# Patient Record
Sex: FEMALE | Race: BLACK OR AFRICAN AMERICAN | NOT HISPANIC OR LATINO | Employment: FULL TIME | URBAN - METROPOLITAN AREA
[De-identification: names, ages, dates, MRNs, and addresses within clinical notes are randomized per-mention and may not be internally consistent; named-entity substitution may affect disease eponyms.]

---

## 2023-05-31 DIAGNOSIS — E55.9 VITAMIN D DEFICIENCY: Primary | ICD-10-CM

## 2023-06-01 RX ORDER — ERGOCALCIFEROL 1.25 MG/1
CAPSULE ORAL
Qty: 12 CAPSULE | Refills: 0 | Status: SHIPPED | OUTPATIENT
Start: 2023-06-01 | End: 2023-10-24 | Stop reason: WASHOUT

## 2023-08-14 ENCOUNTER — OFFICE VISIT (OUTPATIENT)
Dept: PRIMARY CARE | Facility: CLINIC | Age: 48
End: 2023-08-14
Payer: COMMERCIAL

## 2023-08-14 VITALS
TEMPERATURE: 98.1 F | WEIGHT: 193 LBS | RESPIRATION RATE: 16 BRPM | BODY MASS INDEX: 31.15 KG/M2 | SYSTOLIC BLOOD PRESSURE: 129 MMHG | HEART RATE: 68 BPM | DIASTOLIC BLOOD PRESSURE: 87 MMHG

## 2023-08-14 DIAGNOSIS — C64.2 MALIGNANT NEOPLASM OF LEFT KIDNEY, EXCEPT RENAL PELVIS (MULTI): ICD-10-CM

## 2023-08-14 DIAGNOSIS — E78.5 HYPERLIPIDEMIA, UNSPECIFIED HYPERLIPIDEMIA TYPE: ICD-10-CM

## 2023-08-14 DIAGNOSIS — R73.03 PREDIABETES: ICD-10-CM

## 2023-08-14 DIAGNOSIS — I10 PRIMARY HYPERTENSION: Primary | ICD-10-CM

## 2023-08-14 LAB
ALANINE AMINOTRANSFERASE (SGPT) (U/L) IN SER/PLAS: 16 U/L (ref 7–45)
ALBUMIN (G/DL) IN SER/PLAS: 4.5 G/DL (ref 3.4–5)
ALKALINE PHOSPHATASE (U/L) IN SER/PLAS: 92 U/L (ref 33–110)
ANION GAP IN SER/PLAS: 15 MMOL/L (ref 10–20)
ASPARTATE AMINOTRANSFERASE (SGOT) (U/L) IN SER/PLAS: 16 U/L (ref 9–39)
BILIRUBIN TOTAL (MG/DL) IN SER/PLAS: 0.3 MG/DL (ref 0–1.2)
CALCIUM (MG/DL) IN SER/PLAS: 9.9 MG/DL (ref 8.6–10.6)
CARBON DIOXIDE, TOTAL (MMOL/L) IN SER/PLAS: 27 MMOL/L (ref 21–32)
CHLORIDE (MMOL/L) IN SER/PLAS: 99 MMOL/L (ref 98–107)
CHOLESTEROL (MG/DL) IN SER/PLAS: 249 MG/DL (ref 0–199)
CHOLESTEROL IN HDL (MG/DL) IN SER/PLAS: 57.9 MG/DL
CHOLESTEROL/HDL RATIO: 4.3
CREATININE (MG/DL) IN SER/PLAS: 0.88 MG/DL (ref 0.5–1.05)
ERYTHROCYTE DISTRIBUTION WIDTH (RATIO) BY AUTOMATED COUNT: 13.8 % (ref 11.5–14.5)
ERYTHROCYTE MEAN CORPUSCULAR HEMOGLOBIN CONCENTRATION (G/DL) BY AUTOMATED: 31.3 G/DL (ref 32–36)
ERYTHROCYTE MEAN CORPUSCULAR VOLUME (FL) BY AUTOMATED COUNT: 87 FL (ref 80–100)
ERYTHROCYTES (10*6/UL) IN BLOOD BY AUTOMATED COUNT: 4.59 X10E12/L (ref 4–5.2)
ESTIMATED AVERAGE GLUCOSE FOR HBA1C: 131 MG/DL
GFR FEMALE: 82 ML/MIN/1.73M2
GLUCOSE (MG/DL) IN SER/PLAS: 97 MG/DL (ref 74–99)
HEMATOCRIT (%) IN BLOOD BY AUTOMATED COUNT: 39.9 % (ref 36–46)
HEMOGLOBIN (G/DL) IN BLOOD: 12.5 G/DL (ref 12–16)
HEMOGLOBIN A1C/HEMOGLOBIN TOTAL IN BLOOD: 6.2 %
LDL: 159 MG/DL (ref 0–99)
LEUKOCYTES (10*3/UL) IN BLOOD BY AUTOMATED COUNT: 6.5 X10E9/L (ref 4.4–11.3)
NRBC (PER 100 WBCS) BY AUTOMATED COUNT: 0 /100 WBC (ref 0–0)
PLATELETS (10*3/UL) IN BLOOD AUTOMATED COUNT: 494 X10E9/L (ref 150–450)
POTASSIUM (MMOL/L) IN SER/PLAS: 4.7 MMOL/L (ref 3.5–5.3)
PROTEIN TOTAL: 8.1 G/DL (ref 6.4–8.2)
SODIUM (MMOL/L) IN SER/PLAS: 136 MMOL/L (ref 136–145)
THYROTROPIN (MIU/L) IN SER/PLAS BY DETECTION LIMIT <= 0.05 MIU/L: 0.97 MIU/L (ref 0.44–3.98)
TRIGLYCERIDE (MG/DL) IN SER/PLAS: 160 MG/DL (ref 0–149)
UREA NITROGEN (MG/DL) IN SER/PLAS: 13 MG/DL (ref 6–23)
VLDL: 32 MG/DL (ref 0–40)

## 2023-08-14 PROCEDURE — 3079F DIAST BP 80-89 MM HG: CPT | Performed by: STUDENT IN AN ORGANIZED HEALTH CARE EDUCATION/TRAINING PROGRAM

## 2023-08-14 PROCEDURE — 3074F SYST BP LT 130 MM HG: CPT | Performed by: STUDENT IN AN ORGANIZED HEALTH CARE EDUCATION/TRAINING PROGRAM

## 2023-08-14 PROCEDURE — 99204 OFFICE O/P NEW MOD 45 MIN: CPT | Performed by: STUDENT IN AN ORGANIZED HEALTH CARE EDUCATION/TRAINING PROGRAM

## 2023-08-14 PROCEDURE — 84443 ASSAY THYROID STIM HORMONE: CPT

## 2023-08-14 PROCEDURE — 85027 COMPLETE CBC AUTOMATED: CPT

## 2023-08-14 PROCEDURE — 80053 COMPREHEN METABOLIC PANEL: CPT

## 2023-08-14 PROCEDURE — 83036 HEMOGLOBIN GLYCOSYLATED A1C: CPT

## 2023-08-14 PROCEDURE — 90677 PCV20 VACCINE IM: CPT | Performed by: STUDENT IN AN ORGANIZED HEALTH CARE EDUCATION/TRAINING PROGRAM

## 2023-08-14 PROCEDURE — 80061 LIPID PANEL: CPT

## 2023-08-14 PROCEDURE — 90471 IMMUNIZATION ADMIN: CPT | Performed by: STUDENT IN AN ORGANIZED HEALTH CARE EDUCATION/TRAINING PROGRAM

## 2023-08-14 RX ORDER — HYDROCHLOROTHIAZIDE 25 MG/1
25 TABLET ORAL DAILY
COMMUNITY
Start: 2019-07-08 | End: 2023-08-14 | Stop reason: SDUPTHER

## 2023-08-14 RX ORDER — HYDROCHLOROTHIAZIDE 25 MG/1
25 TABLET ORAL DAILY
Qty: 30 TABLET | Refills: 2 | Status: SHIPPED | OUTPATIENT
Start: 2023-08-14 | End: 2023-11-16

## 2023-08-14 RX ORDER — AMLODIPINE BESYLATE 10 MG/1
10 TABLET ORAL DAILY
COMMUNITY
Start: 2021-01-07 | End: 2023-08-14 | Stop reason: SDUPTHER

## 2023-08-14 RX ORDER — AMLODIPINE BESYLATE 10 MG/1
10 TABLET ORAL DAILY
Qty: 30 TABLET | Refills: 2 | Status: SHIPPED | OUTPATIENT
Start: 2023-08-14 | End: 2023-11-16

## 2023-08-14 ASSESSMENT — ENCOUNTER SYMPTOMS
SPEECH DIFFICULTY: 0
NUMBNESS: 0
SHORTNESS OF BREATH: 0
DIZZINESS: 0
ACTIVITY CHANGE: 0
WEAKNESS: 0
ABDOMINAL PAIN: 0
DEPRESSION: 0
VOMITING: 0
DYSURIA: 0
NAUSEA: 0
WHEEZING: 0
HYPERTENSION: 1
HEMATURIA: 0
CONSTIPATION: 0
FEVER: 0
COUGH: 0

## 2023-08-14 NOTE — PROGRESS NOTES
Subjective   Patient ID: Anita Ac is a 48 y.o. female who presents for Establish Care and Hypertension.  Hypertension  Pertinent negatives include no chest pain or shortness of breath.         Ms. Ac is 48 years old here to establish care    Medical history significant for   #1 hypertension-amlodipine 10 mg, hydrochlorothiazide 25 mg  2.  Renal cell carcinoma-s/p left partial nephrectomy in 2020 with clear-cell renal carcinoma-following up with oncology-current monitoring clinically  3.  DVT on Xarelto  #4 prediabetes  5.  Syphilis positive in the past- -had penicillin shots ; negative VDRL recently  6.  Hyperlipidemia          Cancer screening  1.  Mammogram-August 2022 negative  Renal cell carcinoma-following up with nephrology  Colonoscopy December 20 22-2 polyps distal transverse colon [tubular adenoma, benign colonic mucosa] and diverticulitis  Pap October 2018-negative with negative HPV      Surgical history includes left partial nephrectomy, hysterectomy , left rotator cuff tear   Smokes marijuana   No known allergies  Father: DM   Htn : mother   Cad: mother 50s       Past Medical History:   Diagnosis Date    Personal history of other benign neoplasm     History of uterine leiomyoma    Personal history of other benign neoplasm 02/06/2020    History of other benign neoplasm    Personal history of other diseases of the female genital tract 02/06/2020    History of abnormal uterine bleeding    Unspecified abdominal pain 11/11/2020    Abdominal pain, recurrent      Past Surgical History:   Procedure Laterality Date    CT GUIDED PERCUTANEOUS BIOPSY MEDIASTINUM  3/26/2015    CT GUIDED PERCUTANEOUS BIOPSY MEDIASTINUM 3/26/2015 [facility]ICAL [facility]GAC[facility]    OTHER SURGICAL HISTORY  08/11/2014    Uterine Myomectomy    OTHER SURGICAL HISTORY  08/11/2014    Wrist Surgery    OTHER SURGICAL HISTORY  08/02/2022    Rotator cuff repair    OTHER SURGICAL HISTORY  03/11/2020    Hysterectomy abdominal      No family  history on file.   Not on File       Occupation:     Review of Systems   Constitutional:  Negative for activity change and fever.   HENT:  Negative for congestion.    Respiratory:  Negative for cough, shortness of breath and wheezing.    Cardiovascular:  Negative for chest pain and leg swelling.   Gastrointestinal:  Negative for abdominal pain, constipation, nausea and vomiting.   Endocrine: Negative for cold intolerance.   Genitourinary:  Negative for dysuria, hematuria and urgency.   Neurological:  Negative for dizziness, speech difficulty, weakness and numbness.   Psychiatric/Behavioral:  Negative for self-injury and suicidal ideas.        Objective   Visit Vitals  /87   Pulse 68   Temp 36.7 °C (98.1 °F)   Resp 16   Wt 87.5 kg (193 lb)   BMI 31.15 kg/m²   BSA 2.02 m²      Physical Exam  Constitutional:       Appearance: Normal appearance.   HENT:      Head: Normocephalic and atraumatic.      Nose: Nose normal.      Mouth/Throat:      Mouth: Mucous membranes are moist.   Eyes:      Conjunctiva/sclera: Conjunctivae normal.      Pupils: Pupils are equal, round, and reactive to light.   Cardiovascular:      Rate and Rhythm: Normal rate and regular rhythm.      Pulses: Normal pulses.      Heart sounds: Normal heart sounds.   Pulmonary:      Effort: Pulmonary effort is normal.      Breath sounds: Normal breath sounds.   Musculoskeletal:         General: Normal range of motion.      Cervical back: Neck supple.   Skin:     General: Skin is warm.   Neurological:      General: No focal deficit present.      Mental Status: She is alert and oriented to person, place, and time.   Psychiatric:         Mood and Affect: Mood normal.         Behavior: Behavior normal.         Thought Content: Thought content normal.         Judgment: Judgment normal.         Assessment/Plan     Problem List Items Addressed This Visit       Malignant neoplasm of left kidney, except renal pelvis (CMS/HCC)    Relevant Orders    Comprehensive  Metabolic Panel    Pneumococcal conjugate vaccine, 20-valent, adult (PREVNAR 20) (Completed)     Other Visit Diagnoses       Primary hypertension    -  Primary    Relevant Medications    amLODIPine (Norvasc) 10 mg tablet    hydroCHLOROthiazide (HYDRODiuril) 25 mg tablet    Other Relevant Orders    CBC    Hyperlipidemia, unspecified hyperlipidemia type        Relevant Orders    Comprehensive Metabolic Panel    Lipid Panel    TSH with reflex to Free T4 if abnormal    Prediabetes        Relevant Orders    Hemoglobin A1C    Comprehensive Metabolic Panel        Overall patient is here to establish care.  Medical history as above.  Agreeable to get repeat/updated labs.  1.  Hypertension  Currently at goal  To continue amlodipine 10 mg and hydrochlorothiazide 25  Medication refill  2.  Hyperlipidemia  We will repeat cholesterol panel in a fasting state.  Discuss further evaluation and management based on levels/ASCVD score  3.  Prediabetes  Repeat A1c    Agreeable to take pneumonia [undergoing current surveillance for malignancy].  Benefits risks discussed.  Agreeable  Once about results are obtained we will call her with abnormal results of any and discuss further evaluation and management otherwise patient to see me in 3 months for a follow-up or sooner if needed.

## 2023-10-18 PROBLEM — E66.811 CLASS 1 OBESITY WITH BODY MASS INDEX (BMI) OF 32.0 TO 32.9 IN ADULT: Status: ACTIVE | Noted: 2023-10-18

## 2023-10-18 PROBLEM — I10 ESSENTIAL HYPERTENSION: Status: ACTIVE | Noted: 2017-07-05

## 2023-10-18 PROBLEM — E66.9 CLASS 1 OBESITY WITH BODY MASS INDEX (BMI) OF 32.0 TO 32.9 IN ADULT: Status: ACTIVE | Noted: 2023-10-18

## 2023-10-18 PROBLEM — I82.409 DEEP VEIN THROMBOSIS (DVT) (MULTI): Status: ACTIVE | Noted: 2023-10-18

## 2023-10-18 PROBLEM — K57.90 DIVERTICULOSIS: Status: ACTIVE | Noted: 2023-10-18

## 2023-10-18 PROBLEM — R93.5 ABNORMAL COMPUTED TOMOGRAPHY OF ABDOMEN AND PELVIS: Status: ACTIVE | Noted: 2023-10-18

## 2023-10-18 PROBLEM — Z86.718 HISTORY OF VENOUS THROMBOEMBOLISM: Status: ACTIVE | Noted: 2023-10-18

## 2023-10-18 PROBLEM — K57.32 DIVERTICULITIS OF SIGMOID COLON: Status: ACTIVE | Noted: 2023-10-18

## 2023-10-18 PROBLEM — K52.9 GASTROENTERITIS, ACUTE: Status: ACTIVE | Noted: 2023-10-18

## 2023-10-18 PROBLEM — C64.9 RENAL CANCER (MULTI): Status: ACTIVE | Noted: 2023-10-18

## 2023-10-18 PROBLEM — E55.9 VITAMIN D DEFICIENCY: Status: ACTIVE | Noted: 2023-10-18

## 2023-10-18 PROBLEM — I10 CHRONIC HYPERTENSION: Status: ACTIVE | Noted: 2023-10-18

## 2023-10-18 PROBLEM — E78.00 HYPERCHOLESTEREMIA: Status: ACTIVE | Noted: 2023-10-18

## 2023-10-18 PROBLEM — R39.9 ABNORMAL FINDING OF KIDNEY: Status: ACTIVE | Noted: 2023-10-18

## 2023-10-18 PROBLEM — N64.3 GALACTORRHEA: Status: ACTIVE | Noted: 2023-10-18

## 2023-10-18 PROBLEM — R73.9 HYPERGLYCEMIA: Status: ACTIVE | Noted: 2023-10-18

## 2023-10-18 PROBLEM — N28.89 RENAL MASS: Status: ACTIVE | Noted: 2023-10-18

## 2023-10-18 PROBLEM — S69.92XA INJURY OF LEFT RING FINGER: Status: ACTIVE | Noted: 2023-10-18

## 2023-10-18 RX ORDER — RIVAROXABAN 10 MG/1
1 TABLET, FILM COATED ORAL DAILY
COMMUNITY
Start: 2023-06-07 | End: 2023-10-24 | Stop reason: WASHOUT

## 2023-10-18 RX ORDER — LIDOCAINE 50 MG/G
PATCH TOPICAL
COMMUNITY
Start: 2023-03-01

## 2023-10-18 RX ORDER — IBUPROFEN 800 MG/1
TABLET ORAL
COMMUNITY
Start: 2022-12-15 | End: 2023-10-24 | Stop reason: WASHOUT

## 2023-10-18 RX ORDER — DICYCLOMINE HYDROCHLORIDE 10 MG/1
CAPSULE ORAL
COMMUNITY
Start: 2023-06-06 | End: 2023-10-24 | Stop reason: WASHOUT

## 2023-10-18 RX ORDER — CIPROFLOXACIN 250 MG/1
1 TABLET, FILM COATED ORAL EVERY 12 HOURS
COMMUNITY
End: 2023-10-24 | Stop reason: WASHOUT

## 2023-10-18 RX ORDER — APIXABAN 5 MG/1
5 TABLET, FILM COATED ORAL EVERY 12 HOURS
COMMUNITY
End: 2023-10-24 | Stop reason: WASHOUT

## 2023-10-18 RX ORDER — TRIAMCINOLONE ACETONIDE 1 MG/ML
LOTION TOPICAL
COMMUNITY
Start: 2023-03-24 | End: 2024-01-12 | Stop reason: SDUPTHER

## 2023-10-18 RX ORDER — ACETAMINOPHEN 500 MG
1000 TABLET ORAL DAILY
COMMUNITY
Start: 2021-11-18

## 2023-10-19 ENCOUNTER — OFFICE VISIT (OUTPATIENT)
Dept: UROLOGY | Facility: CLINIC | Age: 48
End: 2023-10-19
Payer: COMMERCIAL

## 2023-10-19 VITALS — BODY MASS INDEX: 32.82 KG/M2 | HEIGHT: 65 IN | WEIGHT: 197 LBS

## 2023-10-19 DIAGNOSIS — C64.9 MALIGNANT NEOPLASM OF KIDNEY, UNSPECIFIED LATERALITY (MULTI): Primary | ICD-10-CM

## 2023-10-19 PROCEDURE — 99213 OFFICE O/P EST LOW 20 MIN: CPT | Performed by: STUDENT IN AN ORGANIZED HEALTH CARE EDUCATION/TRAINING PROGRAM

## 2023-10-19 PROCEDURE — 1036F TOBACCO NON-USER: CPT | Performed by: STUDENT IN AN ORGANIZED HEALTH CARE EDUCATION/TRAINING PROGRAM

## 2023-10-19 NOTE — PROGRESS NOTES
HPI:  Procedure: L partial nephrectomy 9/30/2020  Pathology: 1.8 cm G2 Clear cell RCC -SMS      48 year old otherwise healthy woman s/p L PNx. Cre 0.98 (11/17/22). Staging CT concern for incidental DVT. Went to ER where duplex was cw DVT and started on Eliquis. S/p L partial nephrectomy (9/30/20) with pathology showing 1.8 cm G2 Clear cell RCC -SMS. CT AP w IV Contrast (11/18/21) with postoperative change of the LEFT kidney, hepatic steatosis, no suspicious hepatic lesions, no biliary dilatation, gallbladder appears within normal limits, prominent bilateral ovarian follicles, relative thickening of the wall of the urinary bladder suggestive of cystitis. CT CAP (11/23/22) showed no evidence of recurrence or new suspicious lesions involving the osseous or soft tissues structures of the chest abdomen or pelvis, post-op changes consistent with left partial nephrectomy with no abnormal soft tissue lesions in the post-op bed. XR chest (6/1/23) was SANDRO. CT AP (6/2/23) showed postsurgical changes of left partial nephrectomy without evidence of locoregional or metastatic disease in the abdomen or pelvis. Reports constipation. Good urinary function.      Cre: 0.98 (11/17/22), 0.86 (1/7/22), 0.9 (1/4/21), baseline 0.94 (8/17/20)     CT AP w IV Contrast (1/4/21): Postsurgical changes status post partial left nephrectomy with resection of a previously visualized mass in the upper pole the left kidney. Mild nonspecific perinephric stranding is visualized adjacent to the left kidney without definite evidence of local tumor recurrence. Enlarged appearance of the ovaries with multiple cysts versus follicles and dilated tubular structures in the region of the bilateral adnexa. Further evaluation with pelvic ultrasound is recommended to exclude a component of polycystic ovarian disease and/or hydrosalpinx. Dilated appearance of the right external iliac and proximal right common femoral vein with suggestion of possible filling defect.  Although this finding may be related to mixing artifact, thrombosis involving these veins cannot fully be excluded. Further evaluation with a lower extremity duplex ultrasound study could be considered. Colonic diverticulosis without evidence of acute diverticulitis. Stable small umbilical hernia with associated mild fat stranding and protrusion of several nondilated loops of small bowel.     CT AP w IV Contrast (11/18/21): Postoperative change of the LEFT kidney noted. Hepatic steatosis is present. No suspicious hepatic lesion is demonstrated. No biliary dilatation is demonstrated. The gallbladder appears within normal limits. Prominent bilateral ovarian follicles. Relative thickening of the wall of the urinary bladder is present, suggestive of cystitis.     CT CAP (11/23/22) w/ contrast: no CT evidence of recurrence or new suspicious lesions involving the osseous or soft tissues structures of the chest abdomen or pelvis, post-op changes consistent with left partial nephrectomy with no abnormal soft tissue lesions in the post-op bed, redemonstration of stable filling defect in the right deep femoral vein which is unchanged compared to prior examination on 11/18/2021 and 01/04/2021.     XR chest (6/1/23): SANDRO    CT AP (6/2/23): postsurgical changes of left partial nephrectomy without evidence of locoregional or metastatic disease in the abdomen or pelvis.    Review of Systems:  All systems reviewed. Anything negative noted in the HPI.    Physical Exam:  Vitals signs reviewed.  Constitutional:      Appearance: Well-developed.  HENT:     Head: Normocephalic and atraumatic.  Neck:     Musculoskeletal: Normal range of motion.  Pulmonary:     Effort: Pulmonary effort is normal.  Musculoskeletal: Normal range of motion.  Skin:     General: Skin is warm and dry.  Neurological:     Mental Status: Alert and oriented to person, place, and time.  Psychiatric:        Behavior: Behavior normal.        Thought Content: Thought  content normal.        Judgment: Judgment normal.  Abdominal:     Palpations: Abdomen is soft. There is no mass.     Tenderness: There is no tenderness. There is no guarding or rebound.     Hernia: No hernia is present.     Comments:     Assessment/Plan   48 year old otherwise healthy woman s/p L PNx. Cre 0.98 (11/17/22). Staging CT concern for incidental DVT. Went to ER where duplex was cw DVT and started on Eliquis. S/p L partial nephrectomy (9/30/20) with pathology showing 1.8 cm G2 Clear cell RCC -SMS. CT AP w IV Contrast (11/18/21) with postoperative change of the LEFT kidney, hepatic steatosis, no suspicious hepatic lesions, no biliary dilatation, gallbladder appears within normal limits, prominent bilateral ovarian follicles, relative thickening of the wall of the urinary bladder suggestive of cystitis. CT CAP (11/23/22) showed no evidence of recurrence or new suspicious lesions involving the osseous or soft tissues structures of the chest abdomen or pelvis, post-op changes consistent with left partial nephrectomy with no abnormal soft tissue lesions in the post-op bed. XR chest (6/1/23) was SANDRO. CT AP (6/2/23) showed postsurgical changes of left partial nephrectomy without evidence of locoregional or metastatic disease in the abdomen or pelvis. Good urinary function. Management options including risks, benefits and alternatives discussed at length and all questions answered. Patient prefers to proceed with follow-up in June with CT CAP.             By signing my name below, I, Tony Corley, attest that this documentation has been prepared under the direction and in the presence of Dr. Jose Alfredo.  All medical record entries made by the Scribe were at my direction and personally dictated by me. I have reviewed the chart and agree that the record accurately reflects my personal performance of the history, physical exam, discussion and plan.

## 2023-10-24 ENCOUNTER — OFFICE VISIT (OUTPATIENT)
Dept: GASTROENTEROLOGY | Facility: HOSPITAL | Age: 48
End: 2023-10-24
Payer: COMMERCIAL

## 2023-10-24 VITALS
BODY MASS INDEX: 34.96 KG/M2 | HEART RATE: 80 BPM | TEMPERATURE: 97.4 F | OXYGEN SATURATION: 98 % | DIASTOLIC BLOOD PRESSURE: 95 MMHG | HEIGHT: 63 IN | WEIGHT: 197.3 LBS | SYSTOLIC BLOOD PRESSURE: 151 MMHG

## 2023-10-24 DIAGNOSIS — K58.1 IRRITABLE BOWEL SYNDROME WITH CONSTIPATION: Primary | ICD-10-CM

## 2023-10-24 PROCEDURE — 99214 OFFICE O/P EST MOD 30 MIN: CPT | Performed by: NURSE PRACTITIONER

## 2023-10-24 PROCEDURE — 1036F TOBACCO NON-USER: CPT | Performed by: NURSE PRACTITIONER

## 2023-10-24 PROCEDURE — 3077F SYST BP >= 140 MM HG: CPT | Performed by: NURSE PRACTITIONER

## 2023-10-24 PROCEDURE — 3080F DIAST BP >= 90 MM HG: CPT | Performed by: NURSE PRACTITIONER

## 2023-10-24 RX ORDER — KETOCONAZOLE 20 MG/ML
SHAMPOO, SUSPENSION TOPICAL
COMMUNITY
Start: 2023-09-03 | End: 2024-01-12 | Stop reason: SDUPTHER

## 2023-10-24 RX ORDER — POLYETHYLENE GLYCOL 3350 17 G/17G
17 POWDER, FOR SOLUTION ORAL DAILY
Qty: 30 PACKET | Refills: 11 | Status: SHIPPED | OUTPATIENT
Start: 2023-10-24 | End: 2024-10-23

## 2023-10-24 RX ORDER — KETOCONAZOLE 20 MG/G
CREAM TOPICAL
COMMUNITY
Start: 2023-09-03 | End: 2024-01-12 | Stop reason: SDUPTHER

## 2023-10-24 SDOH — ECONOMIC STABILITY: FOOD INSECURITY: WITHIN THE PAST 12 MONTHS, THE FOOD YOU BOUGHT JUST DIDN'T LAST AND YOU DIDN'T HAVE MONEY TO GET MORE.: NEVER TRUE

## 2023-10-24 SDOH — ECONOMIC STABILITY: FOOD INSECURITY: WITHIN THE PAST 12 MONTHS, YOU WORRIED THAT YOUR FOOD WOULD RUN OUT BEFORE YOU GOT MONEY TO BUY MORE.: NEVER TRUE

## 2023-10-24 ASSESSMENT — PATIENT HEALTH QUESTIONNAIRE - PHQ9
2. FEELING DOWN, DEPRESSED OR HOPELESS: NOT AT ALL
1. LITTLE INTEREST OR PLEASURE IN DOING THINGS: NOT AT ALL
SUM OF ALL RESPONSES TO PHQ9 QUESTIONS 1 & 2: 0
1. LITTLE INTEREST OR PLEASURE IN DOING THINGS: NOT AT ALL
2. FEELING DOWN, DEPRESSED OR HOPELESS: NOT AT ALL
SUM OF ALL RESPONSES TO PHQ9 QUESTIONS 1 AND 2: 0

## 2023-10-24 ASSESSMENT — LIFESTYLE VARIABLES
HOW OFTEN DO YOU HAVE A DRINK CONTAINING ALCOHOL: 2-3 TIMES A WEEK
HOW MANY STANDARD DRINKS CONTAINING ALCOHOL DO YOU HAVE ON A TYPICAL DAY: 3 OR 4
AUDIT-C TOTAL SCORE: 4
SKIP TO QUESTIONS 9-10: 0
HOW OFTEN DO YOU HAVE SIX OR MORE DRINKS ON ONE OCCASION: NEVER

## 2023-10-24 ASSESSMENT — ENCOUNTER SYMPTOMS
ABDOMINAL PAIN: 1
OCCASIONAL FEELINGS OF UNSTEADINESS: 0
LOSS OF SENSATION IN FEET: 0
DEPRESSION: 0

## 2023-10-24 ASSESSMENT — PAIN SCALES - GENERAL: PAINLEVEL: 0-NO PAIN

## 2023-10-24 NOTE — PATIENT INSTRUCTIONS
Irritable bowel syndrome with constipation- Please continue the metamucil and dicyclomine. I will add miralax one capful in a glass of water or juice daily to help you have a more complete BM.     Please call me in 1 week to let me know how you are doing with the miralax, if this is not enough I would recommend starting on linzess 290 mcg daily.  [phone]

## 2023-10-24 NOTE — PROGRESS NOTES
Subjective   Patient ID: Anita Ac is a 48 y.o. female. For follow up of diverticulitis    Abdominal Pain    Previously seen 6/6/23 for acute sigmoid diverticulitis, at that visit I recommended miralax and dicyclomine and adding a fiber supplement  Labs reviewed  9/17/23  Normal cbc and LFT's, lipase 23    Hx left partial nephrectomy  Last colonoscopy 12/2/22 - 2 polyps, pancolonic diverticulosis  Still having pain  Bm: once a week ,incomplete  Using fiber and using the dicyclomine , didn't use the miralax  Pain improves some with BM  Water- lots  Review of Systems   Gastrointestinal:  Positive for abdominal pain.       Objective   Physical Exam  Cardiovascular:      Rate and Rhythm: Normal rate and regular rhythm.      Pulses: Normal pulses.      Heart sounds: Normal heart sounds.   Pulmonary:      Effort: Pulmonary effort is normal.      Breath sounds: Normal breath sounds.   Abdominal:      General: Bowel sounds are normal.      Palpations: Abdomen is soft.         Assessment/Plan   There are no diagnoses linked to this encounter.  Irritable bowel syndrome with constipation- Please continue the metamucil and dicyclomine. I will add miralax one capful in a glass of water or juice daily to help you have a more complete BM.     Please call me in 1 week to let me know how you are doing with the miralax, if this is not enough I would recommend starting on linzess 290 mcg daily.  [phone]

## 2023-11-15 DIAGNOSIS — I10 PRIMARY HYPERTENSION: ICD-10-CM

## 2023-11-16 RX ORDER — AMLODIPINE BESYLATE 10 MG/1
10 TABLET ORAL DAILY
Qty: 30 TABLET | Refills: 2 | Status: SHIPPED | OUTPATIENT
Start: 2023-11-16

## 2023-11-16 RX ORDER — HYDROCHLOROTHIAZIDE 25 MG/1
25 TABLET ORAL DAILY
Qty: 30 TABLET | Refills: 2 | Status: SHIPPED | OUTPATIENT
Start: 2023-11-16

## 2023-11-17 DIAGNOSIS — I82.4Y9 DEEP VEIN THROMBOSIS (DVT) OF PROXIMAL LOWER EXTREMITY, UNSPECIFIED CHRONICITY, UNSPECIFIED LATERALITY (MULTI): Primary | ICD-10-CM

## 2024-01-12 ENCOUNTER — OFFICE VISIT (OUTPATIENT)
Dept: DERMATOLOGY | Facility: CLINIC | Age: 49
End: 2024-01-12
Payer: COMMERCIAL

## 2024-01-12 DIAGNOSIS — L21.9 SEBORRHEIC DERMATITIS: Primary | ICD-10-CM

## 2024-01-12 PROCEDURE — 99213 OFFICE O/P EST LOW 20 MIN: CPT | Performed by: DERMATOLOGY

## 2024-01-12 PROCEDURE — 1036F TOBACCO NON-USER: CPT | Performed by: DERMATOLOGY

## 2024-01-12 RX ORDER — KETOCONAZOLE 20 MG/G
CREAM TOPICAL
Qty: 60 G | Refills: 11 | Status: SHIPPED | OUTPATIENT
Start: 2024-01-12

## 2024-01-12 RX ORDER — TRIAMCINOLONE ACETONIDE 1 MG/ML
LOTION TOPICAL DAILY
Qty: 60 ML | Refills: 3 | Status: SHIPPED | OUTPATIENT
Start: 2024-01-12

## 2024-01-12 RX ORDER — KETOCONAZOLE 20 MG/ML
SHAMPOO, SUSPENSION TOPICAL
Qty: 120 ML | Refills: 11 | Status: SHIPPED | OUTPATIENT
Start: 2024-01-12

## 2024-01-12 NOTE — PROGRESS NOTES
Subjective     Anita Ac is a 48 y.o. female who presents for the following: Seborrheic Dermatitis (Ketoconazole shampoo and cream, Triamcinolone lotion - works okay ).     Patient last seen 3/24/23 for seborrheic dermatitis, was given ketoconazole 2% shampoo, 2% cream, and TAC 0.1% lotion for her scalp for itching. Patient states that it has improved. Patient states that her scalp is itchy but improves when she washes her hair with ketoconazole shampoo. Patient states that she washes her hair every 2 weeks. States that her scalp also improves when she uses TAC 0.1% lotion, which she uses 2-3 times a week.     Review of Systems:  No other skin or systemic complaints other than what is documented elsewhere in the note.    The following portions of the chart were reviewed this encounter and updated as appropriate:       Skin Cancer History  No skin cancer on file.    Specialty Problems    None    Past Medical History:  Anita Ac  has a past medical history of Personal history of other benign neoplasm, Personal history of other benign neoplasm (02/06/2020), Personal history of other diseases of the female genital tract (02/06/2020), and Unspecified abdominal pain (11/11/2020).    Past Surgical History:  Anita Ac  has a past surgical history that includes Other surgical history (08/11/2014); Other surgical history (08/11/2014); Other surgical history (08/02/2022); Other surgical history (03/11/2020); and CT guided percutaneous biopsy mediastinum (3/26/2015).    Family History:  Patient family history includes Diabetes in her father.    Social History:  Anita Ac  reports that she has never smoked. She has been exposed to tobacco smoke. She has never used smokeless tobacco. She reports current alcohol use. She reports current drug use. Frequency: 7.00 times per week. Drug: Marijuana.    Allergies:  Latex, natural rubber; Shellfish derived; and Other    Current Medications / CAM's:    Current  Outpatient Medications:     amLODIPine (Norvasc) 10 mg tablet, TAKE 1 TABLET(10 MG) BY MOUTH EVERY DAY, Disp: 30 tablet, Rfl: 2    hydroCHLOROthiazide (HYDRODiuril) 25 mg tablet, TAKE 1 TABLET(25 MG) BY MOUTH EVERY DAY, Disp: 30 tablet, Rfl: 2    rivaroxaban (Xarelto) 10 mg tablet, Take 1 tablet (10 mg) by mouth once daily., Disp: 30 tablet, Rfl: 11    acetaminophen (Tylenol) 500 mg tablet, Take 2 tablets (1,000 mg) by mouth once daily., Disp: , Rfl:     ketoconazole (NIZOral) 2 % cream, APPLY TOPICALLY TO THE AFFECTED AREA OF FACE TWICE DAILY, Disp: 60 g, Rfl: 11    ketoconazole (NIZOral) 2 % shampoo, LATHER INTO SCALP AND LET SIT FOR 5 MINUTES BEFORE RINSING EVERY WEEK, Disp: 120 mL, Rfl: 11    lidocaine (Lidoderm) 5 % patch, Lidocaine 5 % External Patch Quantity: 7  Refills: 0  Start: 1-Mar-2023, Disp: , Rfl:     polyethylene glycol (Glycolax, Miralax) packet, Take 17 g by mouth once daily., Disp: 30 packet, Rfl: 11    triamcinolone (Kenalog) 0.1 % lotion, Apply topically once daily. Apply to itchy areas of scalp daily as needed, Disp: 60 mL, Rfl: 3     Objective   Well appearing patient in no apparent distress; mood and affect are within normal limits.    A skin examination was performed including the face and neck. All findings within normal limits unless otherwise noted below.    Assessment/Plan   1. Seborrheic dermatitis  Head - Anterior (Face), Scalp  On the scalp is diffuse thin flaky scaling.     Seborrheic dermatitis - well-controlled on scalp and face  - Discussed the chronic, waxing and waning nature of this disease and its relation to commensal yeast on the skin   - Continue ketoconazole 2% shampoo to scalp when showering; recommended to increase use from every two weeks to once weekly. Lather on for 5-10 minutes then rinse in shower.    - Continue ketoconazole 2% cream to face BID as needed  - Continue triamcinolone 0.1% lotion to scalp once daily for pruritus as needed  - Follow up in 1  year    Related Medications  ketoconazole (NIZOral) 2 % cream  APPLY TOPICALLY TO THE AFFECTED AREA OF FACE TWICE DAILY    ketoconazole (NIZOral) 2 % shampoo  LATHER INTO SCALP AND LET SIT FOR 5 MINUTES BEFORE RINSING EVERY WEEK    triamcinolone (Kenalog) 0.1 % lotion  Apply topically once daily. Apply to itchy areas of scalp daily as needed      Jacob Valiente, DO    I saw and evaluated the patient. I personally obtained the key and critical portions of the history and physical exam or was physically present for key and critical portions performed by the resident/fellow. I reviewed the resident/fellow's documentation and discussed the patient with the resident/fellow. I agree with the resident/fellow's medical decision making as documented in the note.    Russell Bateman MD PhD

## 2024-04-05 ENCOUNTER — APPOINTMENT (OUTPATIENT)
Dept: CARDIOLOGY | Facility: HOSPITAL | Age: 49
End: 2024-04-05
Payer: COMMERCIAL

## 2024-04-16 ENCOUNTER — OFFICE VISIT (OUTPATIENT)
Dept: CARDIOLOGY | Facility: HOSPITAL | Age: 49
End: 2024-04-16
Payer: COMMERCIAL

## 2024-04-16 VITALS
RESPIRATION RATE: 16 BRPM | SYSTOLIC BLOOD PRESSURE: 135 MMHG | BODY MASS INDEX: 35.15 KG/M2 | WEIGHT: 198.4 LBS | HEIGHT: 63 IN | DIASTOLIC BLOOD PRESSURE: 85 MMHG | HEART RATE: 75 BPM

## 2024-04-16 DIAGNOSIS — I82.4Y9 DEEP VEIN THROMBOSIS (DVT) OF PROXIMAL LOWER EXTREMITY, UNSPECIFIED CHRONICITY, UNSPECIFIED LATERALITY (MULTI): ICD-10-CM

## 2024-04-16 PROCEDURE — 3075F SYST BP GE 130 - 139MM HG: CPT | Performed by: INTERNAL MEDICINE

## 2024-04-16 PROCEDURE — 99214 OFFICE O/P EST MOD 30 MIN: CPT | Performed by: INTERNAL MEDICINE

## 2024-04-16 PROCEDURE — 3079F DIAST BP 80-89 MM HG: CPT | Performed by: INTERNAL MEDICINE

## 2024-04-16 ASSESSMENT — PAIN SCALES - GENERAL: PAINLEVEL: 7

## 2024-04-16 NOTE — PROGRESS NOTES
"OUTPATIENT FOLLOW-UP -  VASCULAR MEDICINE    DOS: 04/16/2024  Last seen:   09/22/2023     REQUESTING PHYSICIAN:  Maite Negrete MD PCP    REASON FOR FOLLOW-UP:  here for follow up h/o DVT    HISTORY OF PRESENT ILLNESS:     49 yo lady here for follow up h/o DVT. Prev AC resumed bc of + d-dimer (963). Reports now out of xarelto x 2 months. States was taking this every day. Last seen in Sept. In Nov Rx was for #30 tabs and 11 refills. States lost the county insurance 2 months ago and there fore did not have drug. The insurance issues have been resolved but did not follow up with the pharmacy. Not using the med-alert bracelet. Called the pharmacy - Rx last filled in Sept. Reports while off the xarelto has been taking ASA 81 mg daily.     Since last seen: reports follow up for the RCCa is June.     Having RLE pain for a few weeks - seen in the ED. Reports xray and US - told to have a popliteal cyst and ?ACL or ligament issues - planned ortho eval next week.    [facility] US:  IMPRESSION:     Negative study for proximal DVT in the right lower extremity.     Negative study for calf DVT in the right lower extremity.     Negative study for superficial thrombophlebitis in the imaged segments of the right lower extremity.     3.9 cm right popliteal fossa cyst.       From prev notes:  From prev note:  follow up DEIV/CFV ?profunda DVT on CT imaging. Prev onn eliquis. No bleeding on the AC. Using the med-alert bracelet. Surveillance for the RCCA has been stable. Last visit d-dimer checked (off AC) and noted to be elevated - xarelto resumed.      patient initially seen 10/2022:  reported dx with DVT Jan 2021. States this was dx on CT d/t evaluation for recurrent flank pain. Rx eliquis at that time. Had variable compliance with the eliquis initially. Rx seen is from 2/2021 with 6 refills. States she \"took this the ways she was supposed to\" for \"at least 6 months\". Also notes however that she was told to start this again 3 " "months ago and has been taking it since. Also reports has approx 1 1/2 bottle still left at this time. She can not account for 7 months of drug lasting for 17 months of therapy based on her report.      She denies prev. DVT. No FH of DVT.  Reports this blood clot was related to surgery for renal cancer. Reports \"took 1/2 the kidney\". Has not had any additional follow up for this since the visit 1/2021. Also available imaging: VL duplex 1/2021 with age indeterminate DVT. Reviewed CT 11/2021 and 11/2022 finding similar to prev. US done at LAST VISIT C/W CHRONIC CHANGES.      REVIEW OF SYSTEMS:     No fevers, chills, weight is stable  No CP, chest pressure  No cough, + SOB  No BRBPR, melena, hematuria  No bleeding  No edema, no calf pain    PHYSICAL EXAMINATION:   /85 (BP Location: Left arm)   Pulse 75   Resp 16   Ht 1.6 m (5' 3\")   Wt 90 kg (198 lb 6.4 oz)   BMI 35.14 kg/m²     Gen: Appears well, NAD  Chest: CTA  CVS: regular without murmur or gallop  Ext: trace edema right, nontender  Skin: good condition without wounds or lesions  Mood and affect appropriate    ADDITIONAL DATA:   No compression worn. Calf measureents; R- 41.0 CM L- 40.5 CM.      US 1/18/2023:  CONCLUSIONS:  Right Lower Venous: No evidence of acute deep vein thrombus visualized in the right lower extremity. There are chronic changes visualized in the common femoral, mid femoral and distal femoral veins. The right saphenofemoral junction appears patent and free of thrombus. The great saphenous vein from the proximal thigh to the knee appears patent and free of thrombus. Additional Findings: Non-vascularized lymph node noted in the groin.  Left Lower Venous: Left common femoral vein is negative for deep vein thrombus.     Comparison:  Compared with study from 1/14/2021, age indeterminate deep vein thrombosis in the right common femoral vein appears chronic on today's exam. Chronic changes are also noted in the right mid and distal femoral " veins on today's exam.     ASSESSMENT/PLAN:    here for follow up h/o DVT - off AC for several months as noted - likely Sept or at least Nov. She has remained SANDRO from the RCCa - likely the source of the DVT. She is currently immobilized 2/2 to the knee issue/. Reports walking at work. Pending ortho eval as noted. Rx xarelto 10 mg now. Will continue this through the knee eval. With the intent to stop once all other issues have resolved. Follow up 3 months.

## 2024-04-16 NOTE — PATIENT INSTRUCTIONS
ASSESSMENT/PLAN:    here for follow up h/o DVT - off AC for several months as noted - likely Sept or at least Nov. She has remained SANDRO from the RCCa - likely the source of the DVT. She is currently immobilized 2/2 to the knee issue/. Reports walking at work. Pending ortho eval as noted. Rx xarelto 10 mg now. Will continue this through the knee eval. With the intent to stop once all other issues have resolved. Follow up 3 months.

## 2024-06-18 DIAGNOSIS — C64.9 MALIGNANT NEOPLASM OF UNSPECIFIED KIDNEY, EXCEPT RENAL PELVIS (MULTI): Primary | ICD-10-CM

## 2024-06-19 ENCOUNTER — APPOINTMENT (OUTPATIENT)
Dept: RADIOLOGY | Facility: HOSPITAL | Age: 49
End: 2024-06-19
Payer: COMMERCIAL

## 2024-07-03 DIAGNOSIS — C64.9 MALIGNANT NEOPLASM OF UNSPECIFIED KIDNEY, EXCEPT RENAL PELVIS (MULTI): Primary | ICD-10-CM

## 2024-07-05 ENCOUNTER — HOSPITAL ENCOUNTER (OUTPATIENT)
Dept: RADIOLOGY | Facility: HOSPITAL | Age: 49
Discharge: HOME | End: 2024-07-05
Payer: COMMERCIAL

## 2024-07-05 ENCOUNTER — LAB (OUTPATIENT)
Dept: LAB | Facility: HOSPITAL | Age: 49
End: 2024-07-05
Payer: COMMERCIAL

## 2024-07-05 DIAGNOSIS — C64.9 MALIGNANT NEOPLASM OF KIDNEY, UNSPECIFIED LATERALITY (MULTI): ICD-10-CM

## 2024-07-05 DIAGNOSIS — C64.9 MALIGNANT NEOPLASM OF UNSPECIFIED KIDNEY, EXCEPT RENAL PELVIS (MULTI): ICD-10-CM

## 2024-07-05 LAB
CREAT SERPL-MCNC: 0.86 MG/DL (ref 0.5–1.05)
EGFRCR SERPLBLD CKD-EPI 2021: 84 ML/MIN/1.73M*2

## 2024-07-05 PROCEDURE — 74177 CT ABD & PELVIS W/CONTRAST: CPT

## 2024-07-05 PROCEDURE — 36415 COLL VENOUS BLD VENIPUNCTURE: CPT

## 2024-07-05 PROCEDURE — 2550000001 HC RX 255 CONTRASTS: Performed by: STUDENT IN AN ORGANIZED HEALTH CARE EDUCATION/TRAINING PROGRAM

## 2024-07-05 PROCEDURE — 82565 ASSAY OF CREATININE: CPT

## 2024-07-16 ENCOUNTER — OFFICE VISIT (OUTPATIENT)
Dept: CARDIOLOGY | Facility: HOSPITAL | Age: 49
End: 2024-07-16
Payer: COMMERCIAL

## 2024-07-16 ENCOUNTER — LAB (OUTPATIENT)
Dept: LAB | Facility: LAB | Age: 49
End: 2024-07-16
Payer: COMMERCIAL

## 2024-07-16 VITALS
HEIGHT: 62 IN | RESPIRATION RATE: 16 BRPM | WEIGHT: 196 LBS | BODY MASS INDEX: 36.07 KG/M2 | DIASTOLIC BLOOD PRESSURE: 119 MMHG | SYSTOLIC BLOOD PRESSURE: 185 MMHG | HEART RATE: 88 BPM

## 2024-07-16 DIAGNOSIS — I82.4Y9 DEEP VEIN THROMBOSIS (DVT) OF PROXIMAL LOWER EXTREMITY, UNSPECIFIED CHRONICITY, UNSPECIFIED LATERALITY (MULTI): Primary | ICD-10-CM

## 2024-07-16 DIAGNOSIS — I82.4Y9 DEEP VEIN THROMBOSIS (DVT) OF PROXIMAL LOWER EXTREMITY, UNSPECIFIED CHRONICITY, UNSPECIFIED LATERALITY (MULTI): ICD-10-CM

## 2024-07-16 LAB
D DIMER PPP FEU-MCNC: 469 NG/ML FEU
D DIMER PPP FEU-MCNC: 469 NG/ML FEU

## 2024-07-16 PROCEDURE — 85379 FIBRIN DEGRADATION QUANT: CPT

## 2024-07-16 PROCEDURE — 3077F SYST BP >= 140 MM HG: CPT | Performed by: INTERNAL MEDICINE

## 2024-07-16 PROCEDURE — 3080F DIAST BP >= 90 MM HG: CPT | Performed by: INTERNAL MEDICINE

## 2024-07-16 PROCEDURE — 36415 COLL VENOUS BLD VENIPUNCTURE: CPT

## 2024-07-16 PROCEDURE — 99214 OFFICE O/P EST MOD 30 MIN: CPT | Performed by: INTERNAL MEDICINE

## 2024-07-16 ASSESSMENT — COLUMBIA-SUICIDE SEVERITY RATING SCALE - C-SSRS
2. HAVE YOU ACTUALLY HAD ANY THOUGHTS OF KILLING YOURSELF?: NO
6. HAVE YOU EVER DONE ANYTHING, STARTED TO DO ANYTHING, OR PREPARED TO DO ANYTHING TO END YOUR LIFE?: NO
1. IN THE PAST MONTH, HAVE YOU WISHED YOU WERE DEAD OR WISHED YOU COULD GO TO SLEEP AND NOT WAKE UP?: NO

## 2024-07-16 ASSESSMENT — PATIENT HEALTH QUESTIONNAIRE - PHQ9
1. LITTLE INTEREST OR PLEASURE IN DOING THINGS: NOT AT ALL
2. FEELING DOWN, DEPRESSED OR HOPELESS: NOT AT ALL
SUM OF ALL RESPONSES TO PHQ9 QUESTIONS 1 AND 2: 0

## 2024-07-16 ASSESSMENT — PAIN SCALES - GENERAL: PAINLEVEL: 0-NO PAIN

## 2024-07-16 NOTE — PATIENT INSTRUCTIONS
ASSESSMENT/PLAN:    here for follow up h/o DVT currently on low dose xarelto - discussed checking d-dimer - if + will continue the xarelto and if (-) will stop. Discussed the elevated BP and the need to follow up with the PCP ASAP. Discussed taking the amlodipine and considering alternative drugs for the concern for cramping. Labs today. Follow up 6 weeks.

## 2024-07-16 NOTE — PROGRESS NOTES
"OUTPATIENT FOLLOW-UP -  VASCULAR MEDICINE    DOS: 07/16/2024  Last seen:    04/16/2024    REQUESTING PHYSICIAN:  Maite Negrete MD PCP     REASON FOR FOLLOW-UP:  here for follow up h/o DVT currently on low dose xarelto    HISTORY OF PRESENT ILLNESS:     50 yo lady here for follow up h/o DVT currently on low dose xarelto. No bleeding related to the xarelto. Since last seen - the knee is better. Not using the knee brace. Reports had a cortisone injection and this helped. No plans for knee surgery. Continues to follow for the h/o RCCa.        From prev notes:  h/o DVT. Prev AC resumed bc of + d-dimer (963). Reports now out of xarelto x 2 months. States was taking this every day. Last seen in Sept. In Nov Rx was for #30 tabs and 11 refills. States lost the [email or web address] insurance 2 months ago and there fore did not have drug. The insurance issues have been resolved but did not follow up with the pharmacy. Not using the med-alert bracelet. When seen in April called the pharmacy - Rx last filled in Sept. Reports while off the xarelto has been taking ASA 81 mg daily.     From prev note:  follow up DEIV/CFV ?profunda DVT on CT imaging. Prev onn eliquis. No bleeding on the AC. Using the med-alert bracelet. Surveillance for the RCCA has been stable. Last visit d-dimer checked (off AC) and noted to be elevated - xarelto resumed.      patient initially seen 10/2022:  reported dx with DVT Jan 2021. States this was dx on CT d/t evaluation for recurrent flank pain. Rx eliquis at that time. Had variable compliance with the eliquis initially. Rx seen is from 2/2021 with 6 refills. States she \"took this the ways she was supposed to\" for \"at least 6 months\". Also notes however that she was told to start this again 3 months ago and has been taking it since. Also reports has approx 1 1/2 bottle still left at this time. She can not account for 7 months of drug lasting for 17 months of therapy based on her report.      She denies " "prev. DVT. No FH of DVT.  Reports this blood clot was related to surgery for renal cancer. Reports \"took 1/2 the kidney\". Has not had any additional follow up for this since the visit 1/2021. Also available imaging: VL duplex 1/2021 with age indeterminate DVT. Reviewed CT 11/2021 and 11/2022 finding similar to prev. US done at [facility] C/W CHRONIC CHANGES.    REVIEW OF SYSTEMS:     weight is stable  No CP, chest pressure  No cough, SOB  No BRBPR, melena, hematuria  No bleeding  No edema, no calf pain    PHYSICAL EXAMINATION:     BP (!) 185/119 (BP Location: Left arm)   Pulse 88   Resp 16   Ht 1.575 m (5' 2\")   Wt 88.9 kg (196 lb)   BMI 35.85 kg/m²     Gen: Appears well, NAD  Chest: CTA  CVS: regular without murmur or gallop  Ext: trace edema, nontender  Skin: good condition without wounds or lesions  Mood and affect appropriate    ADDITIONAL DATA:   No compression worn. Calf measurements; R- 43.5 CM L- 42.0 CM.    ASSESSMENT/PLAN:    here for follow up h/o DVT currently on low dose xarelto - discussed checking d-dimer - if + will continue the xarelto and if (-) will stop. Discussed the elevated BP and the need to follow up with the PCP ASAP. Discussed taking the amlodipine and considering alternative drugs for the concern for cramping. Labs today. Follow up 6 weeks.    "

## 2024-07-17 DIAGNOSIS — I82.4Y9 DEEP VEIN THROMBOSIS (DVT) OF PROXIMAL LOWER EXTREMITY, UNSPECIFIED CHRONICITY, UNSPECIFIED LATERALITY (MULTI): Primary | ICD-10-CM

## 2024-07-25 ENCOUNTER — APPOINTMENT (OUTPATIENT)
Dept: PRIMARY CARE | Facility: CLINIC | Age: 49
End: 2024-07-25
Payer: COMMERCIAL

## 2024-07-29 ENCOUNTER — LAB (OUTPATIENT)
Dept: LAB | Facility: LAB | Age: 49
End: 2024-07-29
Payer: COMMERCIAL

## 2024-07-29 DIAGNOSIS — C64.9 MALIGNANT NEOPLASM OF UNSPECIFIED KIDNEY, EXCEPT RENAL PELVIS (MULTI): ICD-10-CM

## 2024-07-29 LAB
CREAT SERPL-MCNC: 0.9 MG/DL (ref 0.5–1.05)
EGFRCR SERPLBLD CKD-EPI 2021: 80 ML/MIN/1.73M*2

## 2024-07-29 PROCEDURE — 36415 COLL VENOUS BLD VENIPUNCTURE: CPT

## 2024-07-29 PROCEDURE — 82565 ASSAY OF CREATININE: CPT

## 2024-08-14 ENCOUNTER — APPOINTMENT (OUTPATIENT)
Dept: PRIMARY CARE | Facility: CLINIC | Age: 49
End: 2024-08-14
Payer: COMMERCIAL

## 2024-08-24 ENCOUNTER — HOSPITAL ENCOUNTER (OUTPATIENT)
Dept: RADIOLOGY | Facility: HOSPITAL | Age: 49
Discharge: HOME | End: 2024-08-24
Payer: COMMERCIAL

## 2024-08-24 VITALS — HEIGHT: 62 IN | BODY MASS INDEX: 35.51 KG/M2 | WEIGHT: 193 LBS

## 2024-08-24 DIAGNOSIS — Z12.31 ENCOUNTER FOR SCREENING MAMMOGRAM FOR MALIGNANT NEOPLASM OF BREAST: ICD-10-CM

## 2024-08-24 PROCEDURE — 77063 BREAST TOMOSYNTHESIS BI: CPT | Performed by: STUDENT IN AN ORGANIZED HEALTH CARE EDUCATION/TRAINING PROGRAM

## 2024-08-24 PROCEDURE — 77067 SCR MAMMO BI INCL CAD: CPT

## 2024-08-24 PROCEDURE — 77067 SCR MAMMO BI INCL CAD: CPT | Performed by: STUDENT IN AN ORGANIZED HEALTH CARE EDUCATION/TRAINING PROGRAM

## 2024-08-27 ENCOUNTER — OFFICE VISIT (OUTPATIENT)
Dept: CARDIOLOGY | Facility: HOSPITAL | Age: 49
End: 2024-08-27
Payer: COMMERCIAL

## 2024-08-27 VITALS
HEIGHT: 70 IN | RESPIRATION RATE: 16 BRPM | SYSTOLIC BLOOD PRESSURE: 174 MMHG | WEIGHT: 199 LBS | BODY MASS INDEX: 28.49 KG/M2 | DIASTOLIC BLOOD PRESSURE: 117 MMHG | HEART RATE: 79 BPM

## 2024-08-27 DIAGNOSIS — I82.4Y9 DEEP VEIN THROMBOSIS (DVT) OF PROXIMAL LOWER EXTREMITY, UNSPECIFIED CHRONICITY, UNSPECIFIED LATERALITY (MULTI): Primary | ICD-10-CM

## 2024-08-27 PROCEDURE — 3077F SYST BP >= 140 MM HG: CPT | Performed by: INTERNAL MEDICINE

## 2024-08-27 PROCEDURE — 1036F TOBACCO NON-USER: CPT | Performed by: INTERNAL MEDICINE

## 2024-08-27 PROCEDURE — 99214 OFFICE O/P EST MOD 30 MIN: CPT | Performed by: INTERNAL MEDICINE

## 2024-08-27 PROCEDURE — 3080F DIAST BP >= 90 MM HG: CPT | Performed by: INTERNAL MEDICINE

## 2024-08-27 PROCEDURE — 3008F BODY MASS INDEX DOCD: CPT | Performed by: INTERNAL MEDICINE

## 2024-08-27 ASSESSMENT — ENCOUNTER SYMPTOMS: OCCASIONAL FEELINGS OF UNSTEADINESS: 1

## 2024-08-27 ASSESSMENT — COLUMBIA-SUICIDE SEVERITY RATING SCALE - C-SSRS
6. HAVE YOU EVER DONE ANYTHING, STARTED TO DO ANYTHING, OR PREPARED TO DO ANYTHING TO END YOUR LIFE?: NO
2. HAVE YOU ACTUALLY HAD ANY THOUGHTS OF KILLING YOURSELF?: NO
1. IN THE PAST MONTH, HAVE YOU WISHED YOU WERE DEAD OR WISHED YOU COULD GO TO SLEEP AND NOT WAKE UP?: NO

## 2024-08-27 ASSESSMENT — PAIN SCALES - GENERAL: PAINLEVEL: 4

## 2024-08-27 NOTE — PROGRESS NOTES
"OUTPATIENT FOLLOW-UP -  VASCULAR MEDICINE    DOS: 08/27/2024  Last seen:    07/16/2024    REQUESTING PHYSICIAN:  Maite Negrete MD PCP    REASON FOR FOLLOW-UP:  here for follow up h/o  DVT    HISTORY OF PRESENT ILLNESS:     50 yo lady here for follow up h/o  DVT. Last visit stopped the xarelto. Reports not taking her BP meds - doesn't like the way she feels/./ Since last seen - legs are stable. Ha OA and a cyst behind the right knee. Getting steroid injections.   Component  Ref Range & Units 1 mo ago  (7/16/24) 1 yr ago  (5/31/23) 1 yr ago  (1/18/23)   D-Dimer Non VTE, Quant (ng/mL FEU)  <=500 ng/mL        From prev notes:  h/o DVT. Prev AC resumed bc of + d-dimer (963). Reports now out of xarelto x 2 months. States was taking this every day. Last seen in Sept. In Nov Rx was for #30 tabs and 11 refills. States lost the [employer] insurance 2 months ago and there fore did not have drug. The insurance issues have been resolved but did not follow up with the pharmacy. Not using the med-alert bracelet. When seen in April called the pharmacy - Rx last filled in Sept. Reports while off the xarelto has been taking ASA 81 mg daily.      From prev note:  follow up DEIV/CFV ?profunda DVT on CT imaging. Prev on eliquis. No bleeding on the AC. Using the med-alert bracelet. Surveillance for the RCCA has been stable. Last visit d-dimer checked (off AC) and noted to be elevated - xarelto resumed.      patient initially seen 10/2022:  reported dx with DVT Jan 2021. States this was dx on CT d/t evaluation for recurrent flank pain. Rx eliquis at that time. Had variable compliance with the eliquis initially. Rx seen is from 2/2021 with 6 refills. States she \"took this the ways she was supposed to\" for \"at least 6 months\". Also notes however that she was told to start this again 3 months ago and has been taking it since. Also reports has approx 1 1/2 bottle still left at this time. She can not account for 7 months of " "drug lasting for 17 months of therapy based on her report.      She denies prev. DVT. No FH of DVT.  Reports this blood clot was related to surgery for renal cancer. Reports \"took 1/2 the kidney\". Has not had any additional follow up for this since the visit 1/2021. Also available imaging: VL duplex 1/2021 with age indeterminate DVT. Reviewed CT 11/2021 and 11/2022 finding similar to prev. US done at [facility] C/W CHRONIC CHANGES.    REVIEW OF SYSTEMS:     weight is stable  No CP, chest pressure  + cough, +SOB  No edema, no calf pain    PHYSICAL EXAMINATION:   BP (!) 174/117 (BP Location: Right arm, Patient Position: Sitting)   Pulse 79   Resp 16   Ht 1.778 m (5' 10\")   Wt 90.3 kg (199 lb)   LMP 02/27/2020   BMI 28.55 kg/m²     Gen: Appears well, NAD  Chest: CTA  CVS: regular without murmur or gallop  Ext: no edema, nontender  Skin: good condition without wounds or lesions  Pulses: WWP  Mood and affect appropriate    ADDITIONAL DATA:   No compression worn. Calf measurements; 41.5 CM.    ASSESSMENT/PLAN:    here for follow up h/o  DVT DEIV/CFV ?profunda DVT on CT imaging 2021. D-dimer normal at last visit and had been off AC x approx 2 months - recheck d-dimer - if NEG will continue to hold the AC. Continue the ASA 81 mg daily. Discussed increased risk for DVT and PE and the need for imaging with any new signs or symptoms. Follow up with the PCP regarding the blood pressure - take the meds until seen in follow up. Follow up here 6 months.    "

## 2024-08-30 ENCOUNTER — LAB (OUTPATIENT)
Dept: LAB | Facility: LAB | Age: 49
End: 2024-08-30
Payer: COMMERCIAL

## 2024-08-30 DIAGNOSIS — C64.9 MALIGNANT NEOPLASM OF KIDNEY, UNSPECIFIED LATERALITY (MULTI): ICD-10-CM

## 2024-08-30 DIAGNOSIS — I82.4Y9 DEEP VEIN THROMBOSIS (DVT) OF PROXIMAL LOWER EXTREMITY, UNSPECIFIED CHRONICITY, UNSPECIFIED LATERALITY (MULTI): ICD-10-CM

## 2024-08-30 LAB
ANION GAP SERPL CALC-SCNC: 16 MMOL/L (ref 10–20)
BUN SERPL-MCNC: 29 MG/DL (ref 6–23)
CALCIUM SERPL-MCNC: 10.4 MG/DL (ref 8.6–10.6)
CHLORIDE SERPL-SCNC: 99 MMOL/L (ref 98–107)
CO2 SERPL-SCNC: 26 MMOL/L (ref 21–32)
CREAT SERPL-MCNC: 1.3 MG/DL (ref 0.5–1.05)
D DIMER PPP FEU-MCNC: 515 NG/ML FEU
D DIMER PPP FEU-MCNC: 515 NG/ML FEU
EGFRCR SERPLBLD CKD-EPI 2021: 51 ML/MIN/1.73M*2
GLUCOSE SERPL-MCNC: 78 MG/DL (ref 74–99)
POTASSIUM SERPL-SCNC: 4.2 MMOL/L (ref 3.5–5.3)
SODIUM SERPL-SCNC: 137 MMOL/L (ref 136–145)

## 2024-08-30 PROCEDURE — 85379 FIBRIN DEGRADATION QUANT: CPT

## 2024-08-30 PROCEDURE — 36415 COLL VENOUS BLD VENIPUNCTURE: CPT

## 2024-08-30 PROCEDURE — 80048 BASIC METABOLIC PNL TOTAL CA: CPT

## 2024-09-24 ENCOUNTER — APPOINTMENT (OUTPATIENT)
Dept: PRIMARY CARE | Facility: CLINIC | Age: 49
End: 2024-09-24
Payer: COMMERCIAL

## 2024-09-24 VITALS
WEIGHT: 199 LBS | BODY MASS INDEX: 36.62 KG/M2 | OXYGEN SATURATION: 97 % | SYSTOLIC BLOOD PRESSURE: 167 MMHG | DIASTOLIC BLOOD PRESSURE: 111 MMHG | HEIGHT: 62 IN | RESPIRATION RATE: 18 BRPM | HEART RATE: 72 BPM

## 2024-09-24 DIAGNOSIS — E78.5 HYPERLIPIDEMIA, UNSPECIFIED HYPERLIPIDEMIA TYPE: ICD-10-CM

## 2024-09-24 DIAGNOSIS — R73.03 PREDIABETES: ICD-10-CM

## 2024-09-24 DIAGNOSIS — I10 PRIMARY HYPERTENSION: Primary | ICD-10-CM

## 2024-09-24 PROCEDURE — 99214 OFFICE O/P EST MOD 30 MIN: CPT | Performed by: STUDENT IN AN ORGANIZED HEALTH CARE EDUCATION/TRAINING PROGRAM

## 2024-09-24 PROCEDURE — 3077F SYST BP >= 140 MM HG: CPT | Performed by: STUDENT IN AN ORGANIZED HEALTH CARE EDUCATION/TRAINING PROGRAM

## 2024-09-24 PROCEDURE — 3008F BODY MASS INDEX DOCD: CPT | Performed by: STUDENT IN AN ORGANIZED HEALTH CARE EDUCATION/TRAINING PROGRAM

## 2024-09-24 PROCEDURE — 3080F DIAST BP >= 90 MM HG: CPT | Performed by: STUDENT IN AN ORGANIZED HEALTH CARE EDUCATION/TRAINING PROGRAM

## 2024-09-24 RX ORDER — NIFEDIPINE 30 MG/1
30 TABLET, FILM COATED, EXTENDED RELEASE ORAL
Qty: 30 TABLET | Refills: 2 | Status: SHIPPED | OUTPATIENT
Start: 2024-09-24 | End: 2024-09-30

## 2024-09-24 ASSESSMENT — PATIENT HEALTH QUESTIONNAIRE - PHQ9
1. LITTLE INTEREST OR PLEASURE IN DOING THINGS: NOT AT ALL
SUM OF ALL RESPONSES TO PHQ9 QUESTIONS 1 AND 2: 0
2. FEELING DOWN, DEPRESSED OR HOPELESS: NOT AT ALL

## 2024-09-24 ASSESSMENT — ENCOUNTER SYMPTOMS
OCCASIONAL FEELINGS OF UNSTEADINESS: 0
LOSS OF SENSATION IN FEET: 0
DEPRESSION: 0

## 2024-09-24 ASSESSMENT — PAIN SCALES - GENERAL: PAINLEVEL: 0-NO PAIN

## 2024-09-24 NOTE — PROGRESS NOTES
Subjective   Patient ID: Anita Ac is a 49 y.o. female who presents for Hypertension and Medication Problem (Want med adjustment).  Hypertension  Pertinent negatives include no chest pain or shortness of breath.     Patient here for follow-up on hypertension.  Reports has not been taking her medication since 2 months as it gave her headache.  Denies current headache or any symptoms pertaining to cardiovascular system.    Other medical history    2.  Renal cell carcinoma-s/p left partial nephrectomy in 2020 with clear-cell renal carcinoma-following up with oncology-current monitoring clinically  3.  DVT  on asprin 81 (prev on xraelto; taken off by vascular med)  #4 prediabetes  5.  Syphilis positive in the past- -had penicillin shots ; negative VDRL recently  6.  Hyperlipidemia  Cancer screening  1.  Mammogram-August 2022 negative  Renal cell carcinoma-following up with nephrology  Colonoscopy December 20 22-2 polyps distal transverse colon [tubular adenoma, benign colonic mucosa] and diverticulitis  Pap October 2018-negative with negative HPV        Surgical history includes left partial nephrectomy, hysterectomy , left rotator cuff tear   Smokes marijuana   No known allergies  Father: DM   Htn : mother   Cad: mother 50s   Past Medical History:   Diagnosis Date    Personal history of other benign neoplasm     History of uterine leiomyoma    Personal history of other benign neoplasm 02/06/2020    History of other benign neoplasm    Personal history of other diseases of the female genital tract 02/06/2020    History of abnormal uterine bleeding    Unspecified abdominal pain 11/11/2020    Abdominal pain, recurrent      Past Surgical History:   Procedure Laterality Date    CT GUIDED PERCUTANEOUS BIOPSY MEDIASTINUM  3/26/2015    CT GUIDED PERCUTANEOUS BIOPSY MEDIASTINUM 3/26/2015 [facility]    OTHER SURGICAL HISTORY  08/11/2014    Uterine Myomectomy    OTHER SURGICAL HISTORY  08/11/2014    Wrist Surgery     "OTHER SURGICAL HISTORY  08/02/2022    Rotator cuff repair    OTHER SURGICAL HISTORY  03/11/2020    Hysterectomy abdominal      Family History   Problem Relation Name Age of Onset    Diabetes Father        Allergies   Allergen Reactions    Latex, Natural Rubber Itching    Shellfish Derived Angioedema    Other Other          Occupation:     Review of Systems   Constitutional:  Negative for activity change and fever.   HENT:  Negative for congestion.    Respiratory:  Negative for cough, shortness of breath and wheezing.    Cardiovascular:  Negative for chest pain and leg swelling.   Gastrointestinal:  Negative for abdominal pain, constipation, nausea and vomiting.   Endocrine: Negative for cold intolerance.   Genitourinary:  Negative for dysuria, hematuria and urgency.   Neurological:  Negative for dizziness, speech difficulty, weakness and numbness.   Psychiatric/Behavioral:  Negative for self-injury and suicidal ideas.        Objective   Visit Vitals  BP (!) 167/111   Pulse 72   Resp 18   Ht 1.575 m (5' 2\")   Wt 90.3 kg (199 lb)   LMP 02/27/2020   SpO2 97%   BMI 36.40 kg/m²   OB Status Hysterectomy   Smoking Status Never   BSA 1.99 m²      Physical Exam  Constitutional:       Appearance: Normal appearance.   HENT:      Head: Normocephalic and atraumatic.      Nose: Nose normal.      Mouth/Throat:      Mouth: Mucous membranes are moist.   Eyes:      Conjunctiva/sclera: Conjunctivae normal.      Pupils: Pupils are equal, round, and reactive to light.   Cardiovascular:      Rate and Rhythm: Normal rate and regular rhythm.      Pulses: Normal pulses.      Heart sounds: Normal heart sounds.   Pulmonary:      Effort: Pulmonary effort is normal.      Breath sounds: Normal breath sounds.   Musculoskeletal:         General: Normal range of motion.      Cervical back: Neck supple.   Skin:     General: Skin is warm.   Neurological:      General: No focal deficit present.      Mental Status: She is alert and oriented to person, " place, and time.      Cranial Nerves: No cranial nerve deficit.      Sensory: No sensory deficit.      Motor: No weakness.      Coordination: Coordination normal.      Gait: Gait normal.      Deep Tendon Reflexes: Reflexes normal.   Psychiatric:         Mood and Affect: Mood normal.         Behavior: Behavior normal.         Thought Content: Thought content normal.         Judgment: Judgment normal.         Assessment/Plan   Diagnoses and all orders for this visit:  Primary hypertension  -     CBC; Future  -     Comprehensive Metabolic Panel; Future  Hyperlipidemia, unspecified hyperlipidemia type  -     Lipid Panel; Future  -     TSH with reflex to Free T4 if abnormal; Future  Prediabetes  -     Hemoglobin A1C; Future     Medical history significant for   #1 hypertension-amlodipine 10 mg, hydrochlorothiazide 25 mg- not taking her mdication since 2 months as it gave her a headche     Today has elevated BP on vital intake  Repeat BP was around the same as last time. Denies any symptoms pertaining to cardiovascular system/ neurological system incl chest pain / headache/ vision changes/ SOB/ new onset focal neurological deficit     PhysicaL exam as above     Advised patien tto proceed to the ER. WE discussed all risks incl MI? Death, stroke, etc   Declines ER ref     Adv to take the following medication regimen     Recent  elevation in creat, unsure of  the cause following with urology      Start Nifedipine XL 30   Call back if any side effects   Patient to seek medical attention immediately / call 911  if has worsening of symptoms  or develops alarm symptoms as discussed. Verbalizes understanding       Follow up in 10 days for a BP check with labs 3 days prior on an empty stomach

## 2024-09-25 DIAGNOSIS — I10 PRIMARY HYPERTENSION: ICD-10-CM

## 2024-09-30 RX ORDER — NIFEDIPINE 30 MG/1
30 TABLET, EXTENDED RELEASE ORAL
Qty: 90 TABLET | Refills: 0 | Status: SHIPPED | OUTPATIENT
Start: 2024-09-30

## 2024-10-03 ASSESSMENT — ENCOUNTER SYMPTOMS
NUMBNESS: 0
CONSTIPATION: 0
DYSURIA: 0
DIZZINESS: 0
SPEECH DIFFICULTY: 0
ACTIVITY CHANGE: 0
COUGH: 0
ABDOMINAL PAIN: 0
WEAKNESS: 0
HYPERTENSION: 1
HEMATURIA: 0
SHORTNESS OF BREATH: 0
WHEEZING: 0
NAUSEA: 0
VOMITING: 0
FEVER: 0

## 2024-10-17 ENCOUNTER — APPOINTMENT (OUTPATIENT)
Dept: UROLOGY | Facility: CLINIC | Age: 49
End: 2024-10-17
Payer: COMMERCIAL

## 2024-10-31 ENCOUNTER — APPOINTMENT (OUTPATIENT)
Dept: UROLOGY | Facility: CLINIC | Age: 49
End: 2024-10-31
Payer: COMMERCIAL

## 2024-10-31 VITALS — WEIGHT: 190 LBS | HEIGHT: 62 IN | TEMPERATURE: 97.2 F | BODY MASS INDEX: 34.96 KG/M2

## 2024-10-31 DIAGNOSIS — C64.2 MALIGNANT NEOPLASM OF LEFT KIDNEY, EXCEPT RENAL PELVIS (MULTI): Primary | ICD-10-CM

## 2024-10-31 LAB
POC APPEARANCE, URINE: CLEAR
POC BILIRUBIN, URINE: NEGATIVE
POC BLOOD, URINE: NEGATIVE
POC COLOR, URINE: YELLOW
POC GLUCOSE, URINE: NEGATIVE MG/DL
POC KETONES, URINE: NEGATIVE MG/DL
POC LEUKOCYTES, URINE: NEGATIVE
POC NITRITE,URINE: NEGATIVE
POC PH, URINE: 6.5 PH
POC PROTEIN, URINE: NORMAL MG/DL
POC SPECIFIC GRAVITY, URINE: 1.02
POC UROBILINOGEN, URINE: 0.2 EU/DL

## 2024-10-31 PROCEDURE — 81003 URINALYSIS AUTO W/O SCOPE: CPT | Performed by: STUDENT IN AN ORGANIZED HEALTH CARE EDUCATION/TRAINING PROGRAM

## 2024-10-31 PROCEDURE — 3008F BODY MASS INDEX DOCD: CPT | Performed by: STUDENT IN AN ORGANIZED HEALTH CARE EDUCATION/TRAINING PROGRAM

## 2024-10-31 PROCEDURE — 99213 OFFICE O/P EST LOW 20 MIN: CPT | Performed by: STUDENT IN AN ORGANIZED HEALTH CARE EDUCATION/TRAINING PROGRAM

## 2024-10-31 ASSESSMENT — PAIN SCALES - GENERAL: PAINLEVEL_OUTOF10: 0-NO PAIN

## 2024-10-31 ASSESSMENT — ENCOUNTER SYMPTOMS
OCCASIONAL FEELINGS OF UNSTEADINESS: 1
DEPRESSION: 0
LOSS OF SENSATION IN FEET: 0

## 2025-01-15 ENCOUNTER — APPOINTMENT (OUTPATIENT)
Dept: DERMATOLOGY | Facility: CLINIC | Age: 50
End: 2025-01-15
Payer: COMMERCIAL

## 2025-02-25 ENCOUNTER — OFFICE VISIT (OUTPATIENT)
Dept: CARDIOLOGY | Facility: HOSPITAL | Age: 50
End: 2025-02-25
Payer: COMMERCIAL

## 2025-02-25 VITALS
DIASTOLIC BLOOD PRESSURE: 92 MMHG | RESPIRATION RATE: 18 BRPM | HEART RATE: 90 BPM | HEIGHT: 62 IN | SYSTOLIC BLOOD PRESSURE: 184 MMHG | WEIGHT: 202 LBS | BODY MASS INDEX: 37.17 KG/M2

## 2025-02-25 DIAGNOSIS — I82.4Y9 DEEP VEIN THROMBOSIS (DVT) OF PROXIMAL LOWER EXTREMITY, UNSPECIFIED CHRONICITY, UNSPECIFIED LATERALITY (MULTI): Primary | ICD-10-CM

## 2025-02-25 PROCEDURE — 99214 OFFICE O/P EST MOD 30 MIN: CPT | Performed by: INTERNAL MEDICINE

## 2025-02-25 PROCEDURE — 1036F TOBACCO NON-USER: CPT | Performed by: INTERNAL MEDICINE

## 2025-02-25 PROCEDURE — 3077F SYST BP >= 140 MM HG: CPT | Performed by: INTERNAL MEDICINE

## 2025-02-25 PROCEDURE — 3080F DIAST BP >= 90 MM HG: CPT | Performed by: INTERNAL MEDICINE

## 2025-02-25 PROCEDURE — 3008F BODY MASS INDEX DOCD: CPT | Performed by: INTERNAL MEDICINE

## 2025-02-25 RX ORDER — ASPIRIN 325 MG/1
325 TABLET, FILM COATED ORAL AS NEEDED
COMMUNITY
End: 2025-02-25 | Stop reason: ALTCHOICE

## 2025-02-25 ASSESSMENT — PAIN SCALES - GENERAL: PAINLEVEL_OUTOF10: 6

## 2025-02-25 NOTE — PATIENT INSTRUCTIONS
ASSESSMENT/PLAN:    here for follow up h/o  DVT DEIV/CFV ?profunda DVT on CT imaging 2021.  Has remained off AC for 12 months. D-dimer NEG then slightly increased at 515.     Discussed the persistent increased risk for another DVT or PE - but since the initial event was related to the renal cell cancer and this is SANDRO and now off AC for 12 months will continue to hold all AC. Discussed 81 mg ASA - not 325 mg.     Discussed follow up with the PCP regarding the blood pressure.   Discussed follow up with the GYN regarding the vaginal bleeding.  Continue to follow up with the urologist for the renal cell cancer.    Can follow up here as needed.

## 2025-02-25 NOTE — PROGRESS NOTES
"OUTPATIENT FOLLOW-UP -  VASCULAR MEDICINE    DOS: 2/25/25  Last seen:    8/27/24    REQUESTING PHYSICIAN:  Dr. Maite Negrete    REASON FOR FOLLOW-UP:  here for follow up h/o DVT    HISTORY OF PRESENT ILLNESS:     50 yo lady here for follow up h/o DVT. h/o  DVT DEIV/CFV ?profunda DVT on CT imaging 2021.  Plan at last visit: D-dimer normal at last visit and had been off AC x approx 2 months - recheck d-dimer - if NEG will continue to hold the AC. Continue the ASA 81 mg daily. D-dimer was 515 - remains off AC. No interval VTE.    Last visit also recommended to follow up with the PCP regarding BP - reports started a med but had an allergic reaction - stopped the med and has not followed up again.      Recent COVID - not treated. Recent Cr 1.18.     Reports kidney cancer is stable - still in follow up.     From prev notes:  h/o DVT. Prev AC resumed bc of + d-dimer (963). Reports now out of xarelto x 2 months. States was taking this every day. Last seen in Sept 2023. In Nov 2023 Rx was for #30 tabs and 11 refills. [place] lost the [place] insurance 2 months ago and there fore did not have drug. The insurance issues have been resolved but did not follow up with the pharmacy. Not using the med-alert bracelet. When seen in April called the pharmacy - Rx last filled in Sept. Reports while off the xarelto has been taking ASA 81 mg daily.      From prev note:  follow up DEIV/CFV ?profunda DVT on CT imaging. Prev on eliquis. No bleeding on the AC. Using the med-alert bracelet. Surveillance for the RCCA has been stable. Last visit d-dimer checked (off AC) and noted to be elevated - xarelto resumed.      patient initially seen 10/2022:  reported dx with DVT Jan 2021. States this was dx on CT d/t evaluation for recurrent flank pain. Rx eliquis at that time. Had variable compliance with the eliquis initially. Rx seen is from 2/2021 with 6 refills. States she \"took this the ways she was supposed to\" for \"at least 6 months\". " "Also notes however that she was told to start this again 3 months ago and has been taking it since. Also reports has approx 1 1/2 bottle still left at this time. She can not account for 7 months of drug lasting for 17 months of therapy based on her report.      She denies prev. DVT. No FH of DVT.  Reports this blood clot was related to surgery for renal cancer. Reports \"took 1/2 the kidney\". Has not had any additional follow up for this since the visit 1/2021. Also available imaging: VL duplex 1/2021 with age indeterminate DVT. Reviewed CT 11/2021 and 11/2022 finding similar to prev. US done at [facility] C/W CHRONIC CHANGES.         REVIEW OF SYSTEMS:     weight is stable  No CP, chest pressure  + cough, + SOB  + BRBPR,  no melena, hematuria  No bleeding  No edema, no calf pain    PHYSICAL EXAMINATION:   BP (!) 184/92 (BP Location: Left arm, Patient Position: Sitting)   Pulse 90   Resp 18   Ht 1.575 m (5' 2\")   Wt 91.6 kg (202 lb)   LMP 02/27/2020   BMI 36.95 kg/m²     Gen: Appears well, NAD  Chest: CTA  CVS: regular without murmur or gallop  Ext: no edema, nontender  Skin: good condition without wounds or lesions  Pulses: DP 2+; PT 2+  Mood and affect appropriate    ADDITIONAL DATA:   no compression worn  right calf:  42.5cm  left calf:  42.0cm     ASSESSMENT/PLAN:    here for follow up h/o  DVT DEIV/CFV ?profunda DVT on CT imaging 2021.  Has remained off AC for 12 months. D-dimer NEG then slightly increased at 515.     Discussed the persistent increased risk for another DVT or PE - but since the initial event was related to the renal cell cancer and this is SANDRO and now off AC for 12 months will continue to hold all AC. Discussed 81 mg ASA - not 325 mg.     Discussed follow up with the PCP regarding the blood pressure.   Discussed follow up with the GYN regarding the vaginal bleeding.  Continue to follow up with the urologist for the renal cell cancer.    Can follow up here as needed.   "

## 2025-02-28 ENCOUNTER — OFFICE VISIT (OUTPATIENT)
Dept: PRIMARY CARE | Facility: CLINIC | Age: 50
End: 2025-02-28
Payer: COMMERCIAL

## 2025-02-28 VITALS
HEART RATE: 93 BPM | OXYGEN SATURATION: 94 % | WEIGHT: 195 LBS | HEIGHT: 62 IN | BODY MASS INDEX: 35.88 KG/M2 | SYSTOLIC BLOOD PRESSURE: 174 MMHG | DIASTOLIC BLOOD PRESSURE: 123 MMHG

## 2025-02-28 DIAGNOSIS — I10 PRIMARY HYPERTENSION: Primary | ICD-10-CM

## 2025-02-28 DIAGNOSIS — E78.00 HYPERCHOLESTEREMIA: ICD-10-CM

## 2025-02-28 PROCEDURE — 1036F TOBACCO NON-USER: CPT | Performed by: STUDENT IN AN ORGANIZED HEALTH CARE EDUCATION/TRAINING PROGRAM

## 2025-02-28 PROCEDURE — 99214 OFFICE O/P EST MOD 30 MIN: CPT | Performed by: STUDENT IN AN ORGANIZED HEALTH CARE EDUCATION/TRAINING PROGRAM

## 2025-02-28 PROCEDURE — 3077F SYST BP >= 140 MM HG: CPT | Performed by: STUDENT IN AN ORGANIZED HEALTH CARE EDUCATION/TRAINING PROGRAM

## 2025-02-28 PROCEDURE — 3008F BODY MASS INDEX DOCD: CPT | Performed by: STUDENT IN AN ORGANIZED HEALTH CARE EDUCATION/TRAINING PROGRAM

## 2025-02-28 PROCEDURE — 3080F DIAST BP >= 90 MM HG: CPT | Performed by: STUDENT IN AN ORGANIZED HEALTH CARE EDUCATION/TRAINING PROGRAM

## 2025-02-28 RX ORDER — CARVEDILOL 12.5 MG/1
12.5 TABLET ORAL 2 TIMES DAILY
Qty: 60 TABLET | Refills: 1 | Status: SHIPPED | OUTPATIENT
Start: 2025-02-28 | End: 2025-04-29

## 2025-02-28 ASSESSMENT — ENCOUNTER SYMPTOMS
SHORTNESS OF BREATH: 0
NAUSEA: 0
WHEEZING: 0
VOMITING: 0
SPEECH DIFFICULTY: 0
HEMATURIA: 0
NUMBNESS: 0
CONSTIPATION: 0
ABDOMINAL PAIN: 0
COUGH: 0
ACTIVITY CHANGE: 0
DYSURIA: 0
FEVER: 0
WEAKNESS: 0
DIZZINESS: 0

## 2025-02-28 NOTE — PROGRESS NOTES
"Subjective   Patient ID: Anita Ac is a 49 y.o. female who presents for Hypertension.  HPI  Patient is here for a follow up     # HTN   Hydrochlorothiazide 25 - inc urinary frequency   Amlo 10   Nifedipine 30- gave her swelling of lips; patient reports she presented to ER in [facility] 2 days after starting meds but ER note reports migraines.    Last visit 9/2024   Adv follow up with labs but did not follow up     A/p   HTN urgency     Today has elevated BP on vital intake  Repeat BP was around the same as last time. 7  Denies any symptoms pertaining to cardiovascular system/ neurological system incl chest pain / headache/ vision changes/ SOB/ new onset focal neurological deficit     PhysicaL exam as above     Advised patien tto proceed to the ER. WE discussed all risks incl MI? Death, stroke, etc   Declines ER ref       Adv to take the following :  Try carvedilol 12.5mg BID   Get blood work   [] see me  in 15 days   Patient to seek medical attention immediately / call 911  if has worsening of symptoms  or develops alarm symptoms as discussed. Verbalizes understanding   Callback if she has any side effects from medication       2.  Renal cell carcinoma-s/p left partial nephrectomy in 2020 with clear-cell renal carcinoma-following up with oncology-current monitoring clinically  CKD  \"  3.  DVT  on asprin 81 (prev on xraelto; taken off by vascular med)  #4 prediabetes  5.  Syphilis positive in the past- -had penicillin shots ; negative VDRL recently  6.  Hyperlipidemia      Past Medical History:   Diagnosis Date    Personal history of other benign neoplasm     History of uterine leiomyoma    Personal history of other benign neoplasm 02/06/2020    History of other benign neoplasm    Personal history of other diseases of the female genital tract 02/06/2020    History of abnormal uterine bleeding    Unspecified abdominal pain 11/11/2020    Abdominal pain, recurrent      Past Surgical History:   Procedure Laterality Date    " "CT GUIDED PERCUTANEOUS BIOPSY MEDIASTINUM  3/26/2015    CT GUIDED PERCUTANEOUS BIOPSY MEDIASTINUM 3/26/2015 [facility]    OTHER SURGICAL HISTORY  08/11/2014    Uterine Myomectomy    OTHER SURGICAL HISTORY  08/11/2014    Wrist Surgery    OTHER SURGICAL HISTORY  08/02/2022    Rotator cuff repair    OTHER SURGICAL HISTORY  03/11/2020    Hysterectomy abdominal      Family History   Problem Relation Name Age of Onset    Diabetes Father        Allergies   Allergen Reactions    Latex, Natural Rubber Itching    Shellfish Derived Angioedema    Nifedipine Angioedema     Reports been to ER ;  ER visit note from [facility] - reports patient was there for Migraine     Other Other          Occupation:     Review of Systems   Constitutional:  Negative for activity change and fever.   HENT:  Negative for congestion.    Respiratory:  Negative for cough, shortness of breath and wheezing.    Cardiovascular:  Negative for chest pain and leg swelling.   Gastrointestinal:  Negative for abdominal pain, constipation, nausea and vomiting.   Endocrine: Negative for cold intolerance.   Genitourinary:  Negative for dysuria, hematuria and urgency.   Neurological:  Negative for dizziness, speech difficulty, weakness and numbness.   Psychiatric/Behavioral:  Negative for self-injury and suicidal ideas.        Objective   Visit Vitals  BP (!) 174/123 (BP Location: Left arm, Patient Position: Sitting, BP Cuff Size: Large adult)   Pulse 93   Ht 1.575 m (5' 2\")   Wt 88.5 kg (195 lb)   LMP 02/27/2020   SpO2 94%   BMI 35.67 kg/m²   OB Status Hysterectomy   Smoking Status Never   BSA 1.97 m²      Physical Exam  Constitutional:       Appearance: Normal appearance.   HENT:      Head: Normocephalic and atraumatic.      Nose: Nose normal.      Mouth/Throat:      Mouth: Mucous membranes are moist.   Eyes:      Conjunctiva/sclera: Conjunctivae normal.      Pupils: Pupils are equal, round, and reactive to light.   Cardiovascular:      Rate and Rhythm: Normal " rate and regular rhythm.      Pulses: Normal pulses.      Heart sounds: Normal heart sounds.   Pulmonary:      Effort: Pulmonary effort is normal.      Breath sounds: Normal breath sounds.   Musculoskeletal:         General: Normal range of motion.      Cervical back: Neck supple.   Skin:     General: Skin is warm.   Neurological:      General: No focal deficit present.      Mental Status: She is alert and oriented to person, place, and time.      Cranial Nerves: No cranial nerve deficit.      Sensory: No sensory deficit.      Motor: No weakness.      Coordination: Coordination normal.      Gait: Gait normal.      Deep Tendon Reflexes: Reflexes normal.   Psychiatric:         Mood and Affect: Mood normal.         Behavior: Behavior normal.         Thought Content: Thought content normal.         Judgment: Judgment normal.         Assessment/Plan   Diagnoses and all orders for this visit:  Primary hypertension  -     carvedilol (Coreg) 12.5 mg tablet; Take 1 tablet (12.5 mg) by mouth 2 times a day.  Hypercholesteremia

## 2025-03-04 LAB
ALBUMIN SERPL-MCNC: 4.4 G/DL (ref 3.6–5.1)
ALP SERPL-CCNC: 90 U/L (ref 31–125)
ALT SERPL-CCNC: 15 U/L (ref 6–29)
ANION GAP SERPL CALCULATED.4IONS-SCNC: 12 MMOL/L (CALC) (ref 7–17)
AST SERPL-CCNC: 15 U/L (ref 10–35)
BILIRUB SERPL-MCNC: 0.2 MG/DL (ref 0.2–1.2)
BUN SERPL-MCNC: 19 MG/DL (ref 7–25)
CALCIUM SERPL-MCNC: 9.6 MG/DL (ref 8.6–10.2)
CHLORIDE SERPL-SCNC: 103 MMOL/L (ref 98–110)
CHOLEST SERPL-MCNC: 235 MG/DL
CHOLEST/HDLC SERPL: 4 (CALC)
CO2 SERPL-SCNC: 22 MMOL/L (ref 20–32)
CREAT SERPL-MCNC: 1.06 MG/DL (ref 0.5–0.99)
EGFRCR SERPLBLD CKD-EPI 2021: 65 ML/MIN/1.73M2
ERYTHROCYTE [DISTWIDTH] IN BLOOD BY AUTOMATED COUNT: 13.6 % (ref 11–15)
EST. AVERAGE GLUCOSE BLD GHB EST-MCNC: 137 MG/DL
EST. AVERAGE GLUCOSE BLD GHB EST-SCNC: 7.6 MMOL/L
GLUCOSE SERPL-MCNC: 97 MG/DL (ref 65–99)
HBA1C MFR BLD: 6.4 % OF TOTAL HGB
HCT VFR BLD AUTO: 37.7 % (ref 35–45)
HDLC SERPL-MCNC: 59 MG/DL
HGB BLD-MCNC: 12.3 G/DL (ref 11.7–15.5)
LDLC SERPL CALC-MCNC: 145 MG/DL (CALC)
MCH RBC QN AUTO: 27.8 PG (ref 27–33)
MCHC RBC AUTO-ENTMCNC: 32.6 G/DL (ref 32–36)
MCV RBC AUTO: 85.1 FL (ref 80–100)
NONHDLC SERPL-MCNC: 176 MG/DL (CALC)
PLATELET # BLD AUTO: 529 THOUSAND/UL (ref 140–400)
PMV BLD REES-ECKER: 9.4 FL (ref 7.5–12.5)
POTASSIUM SERPL-SCNC: 4.9 MMOL/L (ref 3.5–5.3)
PROT SERPL-MCNC: 7.5 G/DL (ref 6.1–8.1)
RBC # BLD AUTO: 4.43 MILLION/UL (ref 3.8–5.1)
SODIUM SERPL-SCNC: 137 MMOL/L (ref 135–146)
TRIGL SERPL-MCNC: 168 MG/DL
TSH SERPL-ACNC: 1.6 MIU/L
WBC # BLD AUTO: 7.7 THOUSAND/UL (ref 3.8–10.8)

## 2025-03-05 ENCOUNTER — OFFICE VISIT (OUTPATIENT)
Dept: OBSTETRICS AND GYNECOLOGY | Facility: HOSPITAL | Age: 50
End: 2025-03-05
Payer: COMMERCIAL

## 2025-03-05 VITALS
SYSTOLIC BLOOD PRESSURE: 170 MMHG | HEART RATE: 77 BPM | BODY MASS INDEX: 36.62 KG/M2 | HEIGHT: 62 IN | DIASTOLIC BLOOD PRESSURE: 113 MMHG | WEIGHT: 199 LBS

## 2025-03-05 DIAGNOSIS — Z01.419 WELL WOMAN EXAM: Primary | ICD-10-CM

## 2025-03-05 DIAGNOSIS — N89.8 VAGINAL DISCHARGE: ICD-10-CM

## 2025-03-05 DIAGNOSIS — Z11.3 ROUTINE SCREENING FOR STI (SEXUALLY TRANSMITTED INFECTION): ICD-10-CM

## 2025-03-05 PROCEDURE — 3077F SYST BP >= 140 MM HG: CPT

## 2025-03-05 PROCEDURE — 1036F TOBACCO NON-USER: CPT

## 2025-03-05 PROCEDURE — 99386 PREV VISIT NEW AGE 40-64: CPT

## 2025-03-05 PROCEDURE — 3008F BODY MASS INDEX DOCD: CPT

## 2025-03-05 PROCEDURE — 3080F DIAST BP >= 90 MM HG: CPT

## 2025-03-05 SDOH — ECONOMIC STABILITY: FOOD INSECURITY: WITHIN THE PAST 12 MONTHS, THE FOOD YOU BOUGHT JUST DIDN'T LAST AND YOU DIDN'T HAVE MONEY TO GET MORE.: NEVER TRUE

## 2025-03-05 SDOH — ECONOMIC STABILITY: TRANSPORTATION INSECURITY
IN THE PAST 12 MONTHS, HAS THE LACK OF TRANSPORTATION KEPT YOU FROM MEDICAL APPOINTMENTS OR FROM GETTING MEDICATIONS?: NO

## 2025-03-05 SDOH — ECONOMIC STABILITY: TRANSPORTATION INSECURITY
IN THE PAST 12 MONTHS, HAS LACK OF TRANSPORTATION KEPT YOU FROM MEETINGS, WORK, OR FROM GETTING THINGS NEEDED FOR DAILY LIVING?: NO

## 2025-03-05 SDOH — ECONOMIC STABILITY: FOOD INSECURITY: WITHIN THE PAST 12 MONTHS, YOU WORRIED THAT YOUR FOOD WOULD RUN OUT BEFORE YOU GOT MONEY TO BUY MORE.: NEVER TRUE

## 2025-03-05 ASSESSMENT — SOCIAL DETERMINANTS OF HEALTH (SDOH)
WITHIN THE LAST YEAR, HAVE TO BEEN RAPED OR FORCED TO HAVE ANY KIND OF SEXUAL ACTIVITY BY YOUR PARTNER OR EX-PARTNER?: NO
WITHIN THE LAST YEAR, HAVE YOU BEEN KICKED, HIT, SLAPPED, OR OTHERWISE PHYSICALLY HURT BY YOUR PARTNER OR EX-PARTNER?: NO
WITHIN THE LAST YEAR, HAVE YOU BEEN AFRAID OF YOUR PARTNER OR EX-PARTNER?: NO
WITHIN THE LAST YEAR, HAVE YOU BEEN HUMILIATED OR EMOTIONALLY ABUSED IN OTHER WAYS BY YOUR PARTNER OR EX-PARTNER?: NO

## 2025-03-05 ASSESSMENT — PAIN SCALES - GENERAL: PAINLEVEL_OUTOF10: 0-NO PAIN

## 2025-03-05 ASSESSMENT — PATIENT HEALTH QUESTIONNAIRE - PHQ9
1. LITTLE INTEREST OR PLEASURE IN DOING THINGS: NOT AT ALL
2. FEELING DOWN, DEPRESSED OR HOPELESS: NOT AT ALL
SUM OF ALL RESPONSES TO PHQ9 QUESTIONS 1 & 2: 0

## 2025-03-05 ASSESSMENT — LIFESTYLE VARIABLES
AUDIT-C TOTAL SCORE: 2
HOW MANY STANDARD DRINKS CONTAINING ALCOHOL DO YOU HAVE ON A TYPICAL DAY: 1 OR 2
HOW OFTEN DO YOU HAVE A DRINK CONTAINING ALCOHOL: MONTHLY OR LESS
SKIP TO QUESTIONS 9-10: 0
HOW OFTEN DO YOU HAVE SIX OR MORE DRINKS ON ONE OCCASION: LESS THAN MONTHLY

## 2025-03-05 NOTE — PROGRESS NOTES
"Assessment/Plan   Problem List Items Addressed This Visit    None  Visit Diagnoses         Codes    Well woman exam    -  Primary Z01.419    Relevant Orders    BI mammo bilateral screening tomosynthesis    Routine screening for STI (sexually transmitted infection)     Z11.3    Relevant Orders    Trichomonas vaginalis, Amplified    C. trachomatis / N. gonorrhoeae, Amplified, Urogenital    HIV 1/2 Antigen/Antibody Screen with Reflex to Confirmation    Hepatitis B Surface Antigen    Hepatitis C Antibody    Syphilis Screen with Reflex    Vaginal discharge     N89.8    Relevant Orders    Vaginitis Gram Stain For Bacterial Vaginosis + Yeast    [facility] MISCELLANEOUS TEST (ROOM TEMPERATURE)            Trish Monzon, APRN-CNM     Subjective   Anita Ac is a 49 y.o. female who is here for a routine exam. No menses due to total hysterectomy. Has concerns for vaginal discharge and vaginal spotting with wiping that has been occurring intermittently for \"quite some time\".  Latest episode was last week.    ROS      BP (!) 170/113 (BP Location: Left arm, Patient Position: Sitting, BP Cuff Size: Large adult long)   Pulse 77   Ht 1.575 m (5' 2\")   Wt 90.3 kg (199 lb)   LMP 02/27/2020   BMI 36.40 kg/m²     General:   Alert and oriented x 3   Heart:  Thyroid: Regular rate, rhythm  Euthyroid, normal shape and size   Lungs:  Breast: Clear to auscultation bilaterally  Symmetrical, no skin changes/nipple discharge, redness, tenderness, no masses palpated bilaterally   Abdomen: Soft, non tender   Vulva: EGBUS normal   Vagina: Atrophy noted; thicker white discharge noted in vaginal vault, no active bleeding identified.   Cervix: Surgically absent   Uterus: Surgically absent   Adnexa: NT bilaterally       Thank you for coming to see me for your visit today and most importantly thank you for having a preventative visit.  If lab work was sent, you will see your test result via [facility]  Any prescriptions will be sent " electronically to your pharmacy listed    I look forward to seeing you next year for your health care needs.  Please remember:   Sleep is important - make it a priority for at least 6 hours per night!   Exercise such as walking for 20 minutes per day is beneficial to your physical and mental health   Healthy diets can be expensive -- if you every feel like you are struggling to afford healthy food, please let me know as we have a very good program called [employer] that is available to you   Decrease alcohol and tobacco.  A goal of less than 7 alcoholic drinks per week is recommended for risk reduction of breast and colon cancer by 38%!    Be well!  Trish

## 2025-03-06 LAB
HBV SURFACE AG SERPL QL IA: NORMAL
HCV AB SERPL QL IA: NORMAL
HIV 1+2 AB+HIV1 P24 AG SERPL QL IA: NORMAL
T PALLIDUM AB SER QL IA: NORMAL

## 2025-03-07 ENCOUNTER — TELEPHONE (OUTPATIENT)
Dept: OBSTETRICS AND GYNECOLOGY | Facility: HOSPITAL | Age: 50
End: 2025-03-07
Payer: COMMERCIAL

## 2025-03-09 LAB
BV SCORE VAG QL: NORMAL
C TRACH RRNA SPEC QL NAA+PROBE: NOT DETECTED
N GONORRHOEA RRNA SPEC QL NAA+PROBE: NOT DETECTED
QUEST FLEXITEST1 RESULTS:: NORMAL
QUEST GC CT AMPLIFIED (ALWAYS MESSAGE): NORMAL
T VAGINALIS RRNA SPEC QL NAA+PROBE: NOT DETECTED

## 2025-03-11 LAB
HBV SURFACE AG SERPL QL IA: NORMAL
HCV AB SERPL QL IA: NORMAL
HIV 1+2 AB+HIV1 P24 AG SERPL QL IA: NORMAL
RPR SER QL: ABNORMAL
T PALLIDUM AB SER QL AGGL: REACTIVE
T PALLIDUM AB SER QL IA: POSITIVE

## 2025-03-13 ENCOUNTER — CLINICAL SUPPORT (OUTPATIENT)
Dept: PRIMARY CARE | Facility: CLINIC | Age: 50
End: 2025-03-13
Payer: COMMERCIAL

## 2025-03-13 VITALS — DIASTOLIC BLOOD PRESSURE: 97 MMHG | HEART RATE: 73 BPM | SYSTOLIC BLOOD PRESSURE: 185 MMHG | OXYGEN SATURATION: 96 %

## 2025-03-13 DIAGNOSIS — I10 PRIMARY HYPERTENSION: ICD-10-CM

## 2025-03-13 DIAGNOSIS — I10 PRIMARY HYPERTENSION: Primary | ICD-10-CM

## 2025-03-13 PROCEDURE — 99211 OFF/OP EST MAY X REQ PHY/QHP: CPT | Performed by: STUDENT IN AN ORGANIZED HEALTH CARE EDUCATION/TRAINING PROGRAM

## 2025-03-13 RX ORDER — CARVEDILOL 12.5 MG/1
25 TABLET ORAL 2 TIMES DAILY
Qty: 60 TABLET | Refills: 1 | Status: SHIPPED | OUTPATIENT
Start: 2025-03-13 | End: 2025-03-13 | Stop reason: SDUPTHER

## 2025-03-13 RX ORDER — LOSARTAN POTASSIUM 25 MG/1
25 TABLET ORAL 2 TIMES DAILY
Qty: 60 TABLET | Refills: 0 | Status: SHIPPED | OUTPATIENT
Start: 2025-03-13 | End: 2025-04-12

## 2025-03-13 RX ORDER — CARVEDILOL 25 MG/1
25 TABLET ORAL 2 TIMES DAILY
Qty: 60 TABLET | Refills: 1 | Status: SHIPPED | OUTPATIENT
Start: 2025-03-13 | End: 2025-05-12

## 2025-03-13 NOTE — PROGRESS NOTES
Patient in for BP check  BP (!) 185/97 (BP Location: Right arm)   Pulse 73   LMP 02/27/2020   SpO2 96%        Spoke to patient  She reports she is taking 25 mg carvedilol twice daily [as opposed to prescribed 12.5 mg carvedilol twice daily]  Her blood pressure is still elevated.  Last check blood pressure 172/92  Denies any cardiovascular symptoms of focal neurological deficit    As mentioned in earlier note-reportedly had reactions to nifedipine.  Does not want hydrochlorothiazide-as increased urinary frequency  Kidney functions are stable.  Discussed benefits and risks of other options including ACEs or ARB's [reports lisinopril causes angioedema], hydralazine and other medication  Discussed losartan.  Agreeable.  Start losartan 25 mg twice daily.  Recheck BMP in 1 week to 10 days.  Recheck blood pressure in 10 days  Continue carvedilol twice daily      Patient to call me back if she has any side effects of the medication.  Verbalizes understanding.    Patient to seek medical attention immediately / call 911  if has worsening of symptoms  or develops alarm symptoms as discussed. Verbalizes understanding

## 2025-03-17 DIAGNOSIS — N89.8 VAGINAL DISCHARGE: Primary | ICD-10-CM

## 2025-03-17 RX ORDER — DOXYCYCLINE 100 MG/1
100 CAPSULE ORAL 2 TIMES DAILY
Qty: 14 CAPSULE | Refills: 0 | Status: SHIPPED | OUTPATIENT
Start: 2025-03-17 | End: 2025-03-24

## 2025-03-18 RX ORDER — LOSARTAN POTASSIUM 25 MG/1
25 TABLET ORAL 2 TIMES DAILY
Qty: 180 TABLET | OUTPATIENT
Start: 2025-03-18

## 2025-03-18 RX ORDER — CARVEDILOL 25 MG/1
25 TABLET ORAL 2 TIMES DAILY
Qty: 180 TABLET | OUTPATIENT
Start: 2025-03-18

## 2025-03-20 ENCOUNTER — TELEPHONE (OUTPATIENT)
Dept: OBSTETRICS AND GYNECOLOGY | Facility: HOSPITAL | Age: 50
End: 2025-03-20
Payer: COMMERCIAL

## 2025-03-20 DIAGNOSIS — I10 PRIMARY HYPERTENSION: ICD-10-CM

## 2025-03-20 RX ORDER — CARVEDILOL 25 MG/1
25 TABLET ORAL 2 TIMES DAILY
Qty: 60 TABLET | Refills: 1 | Status: SHIPPED | OUTPATIENT
Start: 2025-03-20 | End: 2025-05-19

## 2025-03-20 RX ORDER — LOSARTAN POTASSIUM 25 MG/1
25 TABLET ORAL 2 TIMES DAILY
Qty: 60 TABLET | Refills: 0 | Status: SHIPPED | OUTPATIENT
Start: 2025-03-20 | End: 2025-04-19

## 2025-03-20 NOTE — TELEPHONE ENCOUNTER
----- Message from Trish Monzon sent at 3/17/2025  1:51 PM EDT -----  Positive for ureaplasma parvum  Doxycycline sent to the pharmacy on file for her to begin treatment  If symptoms don't resolve after doxycycline, the next line treatment can be attempted

## 2025-03-20 NOTE — TELEPHONE ENCOUNTER
Patient verified name and date of birth at start of call. Nurse made patient aware that she tested positive for ureaplasma parvum and doxycycline was sent to pharmacy and per Trish Monzon CNM let us know if she still experiences symptoms and we can try next line of treatment. Patient states that she hasn't experienced symptoms. Nurse made patient aware that she will notify AMY Monzon and she may order another test to evaluate treatment's effectiveness. Patient verbalized understanding and had no further questions, comments or concerns at this time.    KAY Coker

## 2025-03-20 NOTE — TELEPHONE ENCOUNTER
Patient made aware of syphilis results and that RPR was non-reactive. Patient states she was previously infected with syphilis in the past.    KAY Coker

## 2025-04-04 LAB
ANION GAP SERPL CALCULATED.4IONS-SCNC: 8 MMOL/L (CALC) (ref 7–17)
BUN SERPL-MCNC: 14 MG/DL (ref 7–25)
BUN/CREAT SERPL: NORMAL (CALC) (ref 6–22)
CALCIUM SERPL-MCNC: 9.3 MG/DL (ref 8.6–10.2)
CHLORIDE SERPL-SCNC: 104 MMOL/L (ref 98–110)
CO2 SERPL-SCNC: 27 MMOL/L (ref 20–32)
CREAT SERPL-MCNC: 0.94 MG/DL (ref 0.5–0.99)
EGFRCR SERPLBLD CKD-EPI 2021: 75 ML/MIN/1.73M2
GLUCOSE SERPL-MCNC: 89 MG/DL (ref 65–99)
POTASSIUM SERPL-SCNC: 4.8 MMOL/L (ref 3.5–5.3)
SODIUM SERPL-SCNC: 139 MMOL/L (ref 135–146)

## 2025-04-11 ENCOUNTER — APPOINTMENT (OUTPATIENT)
Dept: PRIMARY CARE | Facility: CLINIC | Age: 50
End: 2025-04-11
Payer: COMMERCIAL

## 2025-04-11 VITALS
SYSTOLIC BLOOD PRESSURE: 152 MMHG | HEIGHT: 62 IN | WEIGHT: 196.6 LBS | BODY MASS INDEX: 36.18 KG/M2 | HEART RATE: 79 BPM | DIASTOLIC BLOOD PRESSURE: 93 MMHG | OXYGEN SATURATION: 94 %

## 2025-04-11 DIAGNOSIS — R73.03 PREDIABETES: ICD-10-CM

## 2025-04-11 DIAGNOSIS — E78.00 HYPERCHOLESTEREMIA: Primary | ICD-10-CM

## 2025-04-11 DIAGNOSIS — I10 PRIMARY HYPERTENSION: ICD-10-CM

## 2025-04-11 PROCEDURE — 99214 OFFICE O/P EST MOD 30 MIN: CPT | Performed by: STUDENT IN AN ORGANIZED HEALTH CARE EDUCATION/TRAINING PROGRAM

## 2025-04-11 PROCEDURE — 3080F DIAST BP >= 90 MM HG: CPT | Performed by: STUDENT IN AN ORGANIZED HEALTH CARE EDUCATION/TRAINING PROGRAM

## 2025-04-11 PROCEDURE — 3077F SYST BP >= 140 MM HG: CPT | Performed by: STUDENT IN AN ORGANIZED HEALTH CARE EDUCATION/TRAINING PROGRAM

## 2025-04-11 PROCEDURE — 3008F BODY MASS INDEX DOCD: CPT | Performed by: STUDENT IN AN ORGANIZED HEALTH CARE EDUCATION/TRAINING PROGRAM

## 2025-04-11 RX ORDER — LOSARTAN POTASSIUM 50 MG/1
50 TABLET ORAL 2 TIMES DAILY
Qty: 180 TABLET | Refills: 0 | Status: SHIPPED | OUTPATIENT
Start: 2025-04-11 | End: 2025-07-10

## 2025-04-11 NOTE — PROGRESS NOTES
"Subjective   Patient ID: Anita Ac is a 49 y.o. female who presents for Follow-up.  HPI  Patient is here for a follow up     # HTN   Hydrochlorothiazide 25 - inc urinary frequency   Amlo 10   Nifedipine 30- gave her swelling of lips; patient reports she presented to ER in [facility] 2 days after starting meds but ER note reports migraines.  Blood pressure not at goal  Continue carvedilol 25 twice daily   Inc losartan to 50 BID          2.  Renal cell carcinoma-s/p left partial nephrectomy in 2020 with clear-cell renal carcinoma-following up with oncology-current monitoring clinically  CKD  \"  3.  DVT  on asprin 81 (prev on xraelto; taken off by vascular med)  #4 prediabetes-lifestyle modification  5.  Syphilis positive in the past- -had penicillin shots ; negative VDRL recently  6.  Hyperlipidemia   The 10-year ASCVD risk score (Ade PHILLIPS, et al., 2019) is: 6%    Values used to calculate the score:      Age: 49 years      Sex: Female      Is Non- : Yes      Diabetic: No      Tobacco smoker: No      Systolic Blood Pressure: 152 mmHg      Is BP treated: Yes      HDL Cholesterol: 59 mg/dL      Total Cholesterol: 235 mg/dL      Past Medical History:   Diagnosis Date    Personal history of other benign neoplasm     History of uterine leiomyoma    Personal history of other benign neoplasm 02/06/2020    History of other benign neoplasm    Personal history of other diseases of the female genital tract 02/06/2020    History of abnormal uterine bleeding    Unspecified abdominal pain 11/11/2020    Abdominal pain, recurrent      Past Surgical History:   Procedure Laterality Date    CT GUIDED PERCUTANEOUS BIOPSY MEDIASTINUM  3/26/2015    CT GUIDED PERCUTANEOUS BIOPSY MEDIASTINUM 3/26/2015 [facility]ICAL [facility]GAC[facility]    OTHER SURGICAL HISTORY  08/11/2014    Uterine Myomectomy    OTHER SURGICAL HISTORY  08/11/2014    Wrist Surgery    OTHER SURGICAL HISTORY  08/02/2022    Rotator cuff repair    OTHER SURGICAL HISTORY " " 03/11/2020    Hysterectomy abdominal      Family History   Problem Relation Name Age of Onset    Heart attack Mother      Diabetes Father          multiple    Cancer Sister        Allergies   Allergen Reactions    Latex, Natural Rubber Itching    Shellfish Derived Angioedema    Nifedipine Angioedema     Reports been to ER ;  ER visit note from [facility] - reports patient was there for Migraine     Other Other          Occupation:     Review of Systems   Constitutional:  Negative for activity change and fever.   HENT:  Negative for congestion.    Respiratory:  Negative for cough, shortness of breath and wheezing.    Cardiovascular:  Negative for chest pain and leg swelling.   Gastrointestinal:  Negative for abdominal pain, constipation, nausea and vomiting.   Endocrine: Negative for cold intolerance.   Genitourinary:  Negative for dysuria, hematuria and urgency.   Neurological:  Negative for dizziness, speech difficulty, weakness and numbness.   Psychiatric/Behavioral:  Negative for self-injury and suicidal ideas.        Objective   Visit Vitals  BP (!) 152/93 (BP Location: Right arm, Patient Position: Sitting, BP Cuff Size: Large adult)   Pulse 79   Ht 1.575 m (5' 2\")   Wt 89.2 kg (196 lb 9.6 oz)   LMP 02/27/2020   SpO2 94%   BMI 35.96 kg/m²   OB Status Hysterectomy   Smoking Status Never   BSA 1.98 m²      Physical Exam  Constitutional:       Appearance: Normal appearance.   HENT:      Head: Normocephalic and atraumatic.      Nose: Nose normal.      Mouth/Throat:      Mouth: Mucous membranes are moist.   Eyes:      Conjunctiva/sclera: Conjunctivae normal.      Pupils: Pupils are equal, round, and reactive to light.   Cardiovascular:      Rate and Rhythm: Normal rate and regular rhythm.      Pulses: Normal pulses.      Heart sounds: Normal heart sounds.   Pulmonary:      Effort: Pulmonary effort is normal.      Breath sounds: Normal breath sounds.   Musculoskeletal:         General: Normal range of motion.      Cervical " back: Neck supple.   Skin:     General: Skin is warm.   Neurological:      General: No focal deficit present.      Mental Status: She is alert and oriented to person, place, and time.   Psychiatric:         Mood and Affect: Mood normal.         Behavior: Behavior normal.         Thought Content: Thought content normal.         Judgment: Judgment normal.         Assessment/Plan   Diagnoses and all orders for this visit:  Hypercholesteremia  Primary hypertension  -     losartan (Cozaar) 50 mg tablet; Take 1 tablet (50 mg) by mouth 2 times a day.  Prediabetes

## 2025-04-27 ASSESSMENT — ENCOUNTER SYMPTOMS
COUGH: 0
DYSURIA: 0
DIZZINESS: 0
SPEECH DIFFICULTY: 0
HEMATURIA: 0
ABDOMINAL PAIN: 0
FEVER: 0
WHEEZING: 0
WEAKNESS: 0
ACTIVITY CHANGE: 0
VOMITING: 0
NUMBNESS: 0
SHORTNESS OF BREATH: 0
NAUSEA: 0
CONSTIPATION: 0

## 2025-04-30 ENCOUNTER — HOSPITAL ENCOUNTER (OUTPATIENT)
Dept: RADIOLOGY | Facility: HOSPITAL | Age: 50
Discharge: HOME | End: 2025-04-30
Payer: COMMERCIAL

## 2025-04-30 DIAGNOSIS — C64.2 MALIGNANT NEOPLASM OF LEFT KIDNEY, EXCEPT RENAL PELVIS (MULTI): ICD-10-CM

## 2025-04-30 PROCEDURE — 74177 CT ABD & PELVIS W/CONTRAST: CPT

## 2025-04-30 PROCEDURE — 2550000001 HC RX 255 CONTRASTS: Performed by: STUDENT IN AN ORGANIZED HEALTH CARE EDUCATION/TRAINING PROGRAM

## 2025-04-30 RX ADMIN — IOHEXOL 75 ML: 350 INJECTION, SOLUTION INTRAVENOUS at 14:24

## 2025-06-25 DIAGNOSIS — I10 PRIMARY HYPERTENSION: ICD-10-CM

## 2025-06-25 RX ORDER — CARVEDILOL 25 MG/1
25 TABLET ORAL 2 TIMES DAILY
Qty: 60 TABLET | Refills: 1 | Status: SHIPPED | OUTPATIENT
Start: 2025-06-25

## 2025-07-10 DIAGNOSIS — I10 PRIMARY HYPERTENSION: ICD-10-CM

## 2025-07-10 RX ORDER — LOSARTAN POTASSIUM 50 MG/1
50 TABLET ORAL 2 TIMES DAILY
Qty: 180 TABLET | Refills: 2 | Status: SHIPPED | OUTPATIENT
Start: 2025-07-10

## 2025-07-11 ENCOUNTER — APPOINTMENT (OUTPATIENT)
Dept: PRIMARY CARE | Facility: CLINIC | Age: 50
End: 2025-07-11
Payer: COMMERCIAL

## 2025-07-15 ENCOUNTER — APPOINTMENT (OUTPATIENT)
Dept: CARDIOLOGY | Facility: HOSPITAL | Age: 50
End: 2025-07-15
Payer: COMMERCIAL

## 2025-07-28 ENCOUNTER — PATIENT OUTREACH (OUTPATIENT)
Dept: PRIMARY CARE | Facility: CLINIC | Age: 50
End: 2025-07-28
Payer: COMMERCIAL

## 2025-07-28 RX ORDER — ATORVASTATIN CALCIUM 80 MG/1
80 TABLET, FILM COATED ORAL NIGHTLY
COMMUNITY
Start: 2025-07-26 | End: 2026-01-22

## 2025-07-28 RX ORDER — NAPROXEN SODIUM 220 MG/1
81 TABLET, FILM COATED ORAL
COMMUNITY
Start: 2025-07-25 | End: 2025-10-23

## 2025-07-28 RX ORDER — IBUPROFEN 800 MG/1
800 TABLET, FILM COATED ORAL EVERY 8 HOURS PRN
COMMUNITY
Start: 2025-07-17

## 2025-07-28 RX ORDER — OMEPRAZOLE 20 MG/1
20 CAPSULE, DELAYED RELEASE ORAL
COMMUNITY
Start: 2025-06-26

## 2025-07-28 RX ORDER — LANOLIN ALCOHOL/MO/W.PET/CERES
1 CREAM (GRAM) TOPICAL NIGHTLY
COMMUNITY
Start: 2025-07-26 | End: 2025-10-24

## 2025-07-28 NOTE — PROGRESS NOTES
Outreach made for post discharge follow up. At time of call, the patient stated she is feeling better. She is still having slight left-sided weakness. No PT ordered, but patient said she has a follow up scheduled with Neurology on 8/6/2025. Patient verbalized understanding of medication changes made. Attempted to schedule a primary care hospital follow up, but patient stated she is changing hospital systems and would like her appts with  PCP canceled. Appts canceled per patient request and TCM program closed.     Discharge Facility: [facility]  Discharge Diagnosis: Hypertensive urgency, Stroke  Admission Date: 7/24/2025  Discharge Date: 7/26/2025    PCP Appointment Date: declined to schedule  Specialist Appointment Date: Sleep Medicine recommended,  8/6/2025 3:10 PM Faye MARTINEZ. [facility]  Hospital Encounter and Summary Linked: Yes  Hospital Encounter with Bo Landon MD; Crystal Conte MD; Sanjay Cullen DO, DPM

## 2025-08-01 ENCOUNTER — APPOINTMENT (OUTPATIENT)
Dept: PRIMARY CARE | Facility: CLINIC | Age: 50
End: 2025-08-01
Payer: COMMERCIAL

## 2025-11-03 ENCOUNTER — APPOINTMENT (OUTPATIENT)
Dept: PRIMARY CARE | Facility: CLINIC | Age: 50
End: 2025-11-03
Payer: COMMERCIAL

## 2025-11-06 ENCOUNTER — APPOINTMENT (OUTPATIENT)
Dept: UROLOGY | Facility: CLINIC | Age: 50
End: 2025-11-06
Payer: COMMERCIAL